# Patient Record
Sex: FEMALE | Race: WHITE | NOT HISPANIC OR LATINO | Employment: FULL TIME | URBAN - METROPOLITAN AREA
[De-identification: names, ages, dates, MRNs, and addresses within clinical notes are randomized per-mention and may not be internally consistent; named-entity substitution may affect disease eponyms.]

---

## 2017-08-07 ENCOUNTER — HOME HEALTH ADMISSION (OUTPATIENT)
Dept: HOME HEALTH SERVICES | Facility: HOME HEALTHCARE | Age: 57
End: 2017-08-07

## 2017-08-18 DIAGNOSIS — Z01.810 PRE-OPERATIVE CARDIOVASCULAR EXAMINATION: ICD-10-CM

## 2017-08-18 DIAGNOSIS — Z01.812 PRE-OPERATIVE LABORATORY EXAMINATION: ICD-10-CM

## 2017-08-18 LAB
ANION GAP SERPL CALC-SCNC: 6 MMOL/L (ref 0–11.9)
BUN SERPL-MCNC: 17 MG/DL (ref 8–22)
CALCIUM SERPL-MCNC: 9.7 MG/DL (ref 8.5–10.5)
CHLORIDE SERPL-SCNC: 106 MMOL/L (ref 96–112)
CO2 SERPL-SCNC: 27 MMOL/L (ref 20–33)
CREAT SERPL-MCNC: 0.87 MG/DL (ref 0.5–1.4)
EKG IMPRESSION: NORMAL
ERYTHROCYTE [DISTWIDTH] IN BLOOD BY AUTOMATED COUNT: 42.9 FL (ref 35.9–50)
GFR SERPL CREATININE-BSD FRML MDRD: >60 ML/MIN/1.73 M 2
GLUCOSE SERPL-MCNC: 103 MG/DL (ref 65–99)
HCT VFR BLD AUTO: 39 % (ref 37–47)
HGB BLD-MCNC: 12.9 G/DL (ref 12–16)
MCH RBC QN AUTO: 29.9 PG (ref 27–33)
MCHC RBC AUTO-ENTMCNC: 33.1 G/DL (ref 33.6–35)
MCV RBC AUTO: 90.5 FL (ref 81.4–97.8)
PLATELET # BLD AUTO: 345 K/UL (ref 164–446)
PMV BLD AUTO: 9.2 FL (ref 9–12.9)
POTASSIUM SERPL-SCNC: 3.7 MMOL/L (ref 3.6–5.5)
RBC # BLD AUTO: 4.31 M/UL (ref 4.2–5.4)
SODIUM SERPL-SCNC: 139 MMOL/L (ref 135–145)
WBC # BLD AUTO: 6.5 K/UL (ref 4.8–10.8)

## 2017-08-18 PROCEDURE — 80048 BASIC METABOLIC PNL TOTAL CA: CPT

## 2017-08-18 PROCEDURE — 93005 ELECTROCARDIOGRAM TRACING: CPT

## 2017-08-18 PROCEDURE — 87640 STAPH A DNA AMP PROBE: CPT

## 2017-08-18 PROCEDURE — 85027 COMPLETE CBC AUTOMATED: CPT

## 2017-08-18 PROCEDURE — 36415 COLL VENOUS BLD VENIPUNCTURE: CPT

## 2017-08-18 PROCEDURE — 87641 MR-STAPH DNA AMP PROBE: CPT

## 2017-08-18 PROCEDURE — 93010 ELECTROCARDIOGRAM REPORT: CPT | Performed by: INTERNAL MEDICINE

## 2017-08-18 RX ORDER — IBUPROFEN 200 MG
200-400 TABLET ORAL EVERY 6 HOURS PRN
Status: ON HOLD | COMMUNITY
End: 2021-12-23

## 2017-08-18 RX ORDER — CALCIUM/MAGNESIUM/ZINC 333-133 MG
TABLET ORAL
COMMUNITY
End: 2021-12-22

## 2017-08-18 NOTE — DISCHARGE PLANNING
DISCHARGE PLANNING NOTE - TOTAL JOINT     Procedure: Procedure(s):  KNEE ARTHROPLASTY TOTAL  Procedure Date: 9/5/2017  Insurance:  Payor: [employer] / Plan: [employer] MISC PLAN / Product Type: *No Product type* /   Equipment currently available at home? None  Steps into the home? 2  Steps within the home? 0  Toilet height? Standard  Type of shower? walk-in shower  Who will be with you during your recovery? son  Is Outpatient Physical Therapy set up after surgery? No   Did you take the Total Joint Class and where? No     Plan: The patient stated that she will be scheduling a class with [facility], and will also research and setup Physical Therapy.

## 2017-08-19 LAB
SCCMEC + MECA PNL NOSE NAA+PROBE: NEGATIVE
SCCMEC + MECA PNL NOSE NAA+PROBE: NEGATIVE

## 2017-09-05 ENCOUNTER — APPOINTMENT (OUTPATIENT)
Dept: RADIOLOGY | Facility: MEDICAL CENTER | Age: 57
DRG: 470 | End: 2017-09-05
Attending: ORTHOPAEDIC SURGERY
Payer: COMMERCIAL

## 2017-09-05 ENCOUNTER — HOSPITAL ENCOUNTER (INPATIENT)
Facility: MEDICAL CENTER | Age: 57
LOS: 1 days | DRG: 470 | End: 2017-09-06
Attending: ORTHOPAEDIC SURGERY | Admitting: ORTHOPAEDIC SURGERY
Payer: COMMERCIAL

## 2017-09-05 PROBLEM — M17.11 OSTEOARTHRITIS OF RIGHT KNEE: Status: ACTIVE | Noted: 2017-09-05

## 2017-09-05 PROCEDURE — 97161 PT EVAL LOW COMPLEX 20 MIN: CPT

## 2017-09-05 PROCEDURE — L1830 KO IMMOB CANVAS LONG PRE OTS: HCPCS | Performed by: ORTHOPAEDIC SURGERY

## 2017-09-05 PROCEDURE — A9270 NON-COVERED ITEM OR SERVICE: HCPCS | Performed by: ORTHOPAEDIC SURGERY

## 2017-09-05 PROCEDURE — 500096 HCHG BONE CEMENT, SIMPLEX ANTIBIOTIC: Performed by: ORTHOPAEDIC SURGERY

## 2017-09-05 PROCEDURE — G8978 MOBILITY CURRENT STATUS: HCPCS | Mod: CK

## 2017-09-05 PROCEDURE — 502000 HCHG MISC OR IMPLANTS RC 0278: Performed by: ORTHOPAEDIC SURGERY

## 2017-09-05 PROCEDURE — G8979 MOBILITY GOAL STATUS: HCPCS | Mod: CJ

## 2017-09-05 PROCEDURE — 501838 HCHG SUTURE GENERAL: Performed by: ORTHOPAEDIC SURGERY

## 2017-09-05 PROCEDURE — 700111 HCHG RX REV CODE 636 W/ 250 OVERRIDE (IP)

## 2017-09-05 PROCEDURE — 700101 HCHG RX REV CODE 250

## 2017-09-05 PROCEDURE — 94760 N-INVAS EAR/PLS OXIMETRY 1: CPT

## 2017-09-05 PROCEDURE — A6222 GAUZE <=16 IN NO W/SAL W/O B: HCPCS | Performed by: ORTHOPAEDIC SURGERY

## 2017-09-05 PROCEDURE — 160035 HCHG PACU - 1ST 60 MINS PHASE I: Performed by: ORTHOPAEDIC SURGERY

## 2017-09-05 PROCEDURE — 160048 HCHG OR STATISTICAL LEVEL 1-5: Performed by: ORTHOPAEDIC SURGERY

## 2017-09-05 PROCEDURE — 73560 X-RAY EXAM OF KNEE 1 OR 2: CPT | Mod: RT

## 2017-09-05 PROCEDURE — 500093 HCHG BONE CEMENT MIXER: Performed by: ORTHOPAEDIC SURGERY

## 2017-09-05 PROCEDURE — 160029 HCHG SURGERY MINUTES - 1ST 30 MINS LEVEL 4: Performed by: ORTHOPAEDIC SURGERY

## 2017-09-05 PROCEDURE — 501480 HCHG STOCKINETTE: Performed by: ORTHOPAEDIC SURGERY

## 2017-09-05 PROCEDURE — 700102 HCHG RX REV CODE 250 W/ 637 OVERRIDE(OP): Performed by: ORTHOPAEDIC SURGERY

## 2017-09-05 PROCEDURE — 500088 HCHG BLADE, SAGITTAL: Performed by: ORTHOPAEDIC SURGERY

## 2017-09-05 PROCEDURE — 700112 HCHG RX REV CODE 229: Performed by: ORTHOPAEDIC SURGERY

## 2017-09-05 PROCEDURE — 500423 HCHG DRESSING, ABD COMBINE: Performed by: ORTHOPAEDIC SURGERY

## 2017-09-05 PROCEDURE — 160002 HCHG RECOVERY MINUTES (STAT): Performed by: ORTHOPAEDIC SURGERY

## 2017-09-05 PROCEDURE — 501486 HCHG STRYKER IRRIG SET HC W/TUBING: Performed by: ORTHOPAEDIC SURGERY

## 2017-09-05 PROCEDURE — 500811 HCHG LENS/HOOD FOR SPACESUIT: Performed by: ORTHOPAEDIC SURGERY

## 2017-09-05 PROCEDURE — 160009 HCHG ANES TIME/MIN: Performed by: ORTHOPAEDIC SURGERY

## 2017-09-05 PROCEDURE — 700111 HCHG RX REV CODE 636 W/ 250 OVERRIDE (IP): Performed by: ORTHOPAEDIC SURGERY

## 2017-09-05 PROCEDURE — 160041 HCHG SURGERY MINUTES - EA ADDL 1 MIN LEVEL 4: Performed by: ORTHOPAEDIC SURGERY

## 2017-09-05 PROCEDURE — 160022 HCHG BLOCK: Performed by: ORTHOPAEDIC SURGERY

## 2017-09-05 PROCEDURE — 160036 HCHG PACU - EA ADDL 30 MINS PHASE I: Performed by: ORTHOPAEDIC SURGERY

## 2017-09-05 PROCEDURE — 700105 HCHG RX REV CODE 258

## 2017-09-05 PROCEDURE — 501487 HCHG STRYKER TIP: Performed by: ORTHOPAEDIC SURGERY

## 2017-09-05 PROCEDURE — 770006 HCHG ROOM/CARE - MED/SURG/GYN SEMI*

## 2017-09-05 PROCEDURE — 0SRC0J9 REPLACEMENT OF RIGHT KNEE JOINT WITH SYNTHETIC SUBSTITUTE, CEMENTED, OPEN APPROACH: ICD-10-PCS | Performed by: ORTHOPAEDIC SURGERY

## 2017-09-05 PROCEDURE — 700105 HCHG RX REV CODE 258: Performed by: ORTHOPAEDIC SURGERY

## 2017-09-05 PROCEDURE — 502579 HCHG PACK, TOTAL KNEE: Performed by: ORTHOPAEDIC SURGERY

## 2017-09-05 DEVICE — IMPLANTABLE DEVICE: Type: IMPLANTABLE DEVICE | Status: FUNCTIONAL

## 2017-09-05 DEVICE — CEMENT BONE SIMPLEX W/GENTAICIN (10/PK): Type: IMPLANTABLE DEVICE | Status: FUNCTIONAL

## 2017-09-05 RX ORDER — MORPHINE SULFATE 0.5 MG/ML
INJECTION, SOLUTION EPIDURAL; INTRATHECAL; INTRAVENOUS
Status: DISCONTINUED | OUTPATIENT
Start: 2017-09-05 | End: 2017-09-05 | Stop reason: HOSPADM

## 2017-09-05 RX ORDER — ONDANSETRON 2 MG/ML
4 INJECTION INTRAMUSCULAR; INTRAVENOUS EVERY 4 HOURS PRN
Status: DISCONTINUED | OUTPATIENT
Start: 2017-09-05 | End: 2017-09-06 | Stop reason: HOSPADM

## 2017-09-05 RX ORDER — SCOLOPAMINE TRANSDERMAL SYSTEM 1 MG/1
1 PATCH, EXTENDED RELEASE TRANSDERMAL
Status: DISCONTINUED | OUTPATIENT
Start: 2017-09-05 | End: 2017-09-06 | Stop reason: HOSPADM

## 2017-09-05 RX ORDER — BUPIVACAINE HYDROCHLORIDE 2.5 MG/ML
INJECTION, SOLUTION EPIDURAL; INFILTRATION; INTRACAUDAL
Status: COMPLETED
Start: 2017-09-05 | End: 2017-09-05

## 2017-09-05 RX ORDER — MIDAZOLAM HYDROCHLORIDE 1 MG/ML
INJECTION INTRAMUSCULAR; INTRAVENOUS
Status: COMPLETED
Start: 2017-09-05 | End: 2017-09-05

## 2017-09-05 RX ORDER — DIPHENHYDRAMINE HYDROCHLORIDE 50 MG/ML
25 INJECTION INTRAMUSCULAR; INTRAVENOUS EVERY 6 HOURS PRN
Status: DISCONTINUED | OUTPATIENT
Start: 2017-09-05 | End: 2017-09-06 | Stop reason: HOSPADM

## 2017-09-05 RX ORDER — CELECOXIB 200 MG/1
200 CAPSULE ORAL 2 TIMES DAILY WITH MEALS
Status: DISCONTINUED | OUTPATIENT
Start: 2017-09-05 | End: 2017-09-06 | Stop reason: HOSPADM

## 2017-09-05 RX ORDER — OXYCODONE HYDROCHLORIDE 5 MG/1
2.5 TABLET ORAL
Status: DISCONTINUED | OUTPATIENT
Start: 2017-09-05 | End: 2017-09-06 | Stop reason: HOSPADM

## 2017-09-05 RX ORDER — BUPIVACAINE HYDROCHLORIDE AND EPINEPHRINE 5; 5 MG/ML; UG/ML
INJECTION, SOLUTION EPIDURAL; INTRACAUDAL; PERINEURAL
Status: DISCONTINUED | OUTPATIENT
Start: 2017-09-05 | End: 2017-09-05 | Stop reason: HOSPADM

## 2017-09-05 RX ORDER — ACETAMINOPHEN 500 MG
1000 TABLET ORAL EVERY 6 HOURS PRN
COMMUNITY
End: 2021-12-22

## 2017-09-05 RX ORDER — BISACODYL 10 MG
10 SUPPOSITORY, RECTAL RECTAL
Status: DISCONTINUED | OUTPATIENT
Start: 2017-09-05 | End: 2017-09-06 | Stop reason: HOSPADM

## 2017-09-05 RX ORDER — DOCUSATE SODIUM 100 MG/1
100 CAPSULE, LIQUID FILLED ORAL 2 TIMES DAILY
Status: DISCONTINUED | OUTPATIENT
Start: 2017-09-05 | End: 2017-09-06

## 2017-09-05 RX ORDER — SODIUM CHLORIDE, SODIUM LACTATE, POTASSIUM CHLORIDE, CALCIUM CHLORIDE 600; 310; 30; 20 MG/100ML; MG/100ML; MG/100ML; MG/100ML
INJECTION, SOLUTION INTRAVENOUS
Status: DISCONTINUED | OUTPATIENT
Start: 2017-09-05 | End: 2017-09-06

## 2017-09-05 RX ORDER — EPINEPHRINE 1 MG/ML
INJECTION, SOLUTION, CONCENTRATE INTRAVENOUS
Status: COMPLETED
Start: 2017-09-05 | End: 2017-09-05

## 2017-09-05 RX ORDER — HALOPERIDOL 5 MG/ML
1 INJECTION INTRAMUSCULAR EVERY 6 HOURS PRN
Status: DISCONTINUED | OUTPATIENT
Start: 2017-09-05 | End: 2017-09-06 | Stop reason: HOSPADM

## 2017-09-05 RX ORDER — POLYETHYLENE GLYCOL 3350 17 G/17G
1 POWDER, FOR SOLUTION ORAL 2 TIMES DAILY PRN
Status: DISCONTINUED | OUTPATIENT
Start: 2017-09-05 | End: 2017-09-06 | Stop reason: HOSPADM

## 2017-09-05 RX ORDER — DEXAMETHASONE SODIUM PHOSPHATE 4 MG/ML
4 INJECTION, SOLUTION INTRA-ARTICULAR; INTRALESIONAL; INTRAMUSCULAR; INTRAVENOUS; SOFT TISSUE
Status: DISCONTINUED | OUTPATIENT
Start: 2017-09-05 | End: 2017-09-06 | Stop reason: HOSPADM

## 2017-09-05 RX ORDER — KETOROLAC TROMETHAMINE 30 MG/ML
INJECTION, SOLUTION INTRAMUSCULAR; INTRAVENOUS
Status: DISCONTINUED | OUTPATIENT
Start: 2017-09-05 | End: 2017-09-05 | Stop reason: HOSPADM

## 2017-09-05 RX ORDER — AMOXICILLIN 250 MG
1 CAPSULE ORAL
Status: DISCONTINUED | OUTPATIENT
Start: 2017-09-05 | End: 2017-09-06 | Stop reason: HOSPADM

## 2017-09-05 RX ORDER — LIDOCAINE HYDROCHLORIDE 10 MG/ML
INJECTION, SOLUTION INFILTRATION; PERINEURAL
Status: COMPLETED
Start: 2017-09-05 | End: 2017-09-05

## 2017-09-05 RX ORDER — OXYCODONE HYDROCHLORIDE 5 MG/1
5 TABLET ORAL
Status: DISCONTINUED | OUTPATIENT
Start: 2017-09-05 | End: 2017-09-06 | Stop reason: HOSPADM

## 2017-09-05 RX ORDER — TRANEXAMIC ACID 100 MG/ML
INJECTION, SOLUTION INTRAVENOUS
Status: COMPLETED
Start: 2017-09-05 | End: 2017-09-05

## 2017-09-05 RX ORDER — ENEMA 19; 7 G/133ML; G/133ML
1 ENEMA RECTAL
Status: DISCONTINUED | OUTPATIENT
Start: 2017-09-05 | End: 2017-09-06 | Stop reason: HOSPADM

## 2017-09-05 RX ORDER — DEXAMETHASONE SODIUM PHOSPHATE 4 MG/ML
INJECTION, SOLUTION INTRA-ARTICULAR; INTRALESIONAL; INTRAMUSCULAR; INTRAVENOUS; SOFT TISSUE
Status: COMPLETED
Start: 2017-09-05 | End: 2017-09-05

## 2017-09-05 RX ORDER — AMOXICILLIN 250 MG
1 CAPSULE ORAL NIGHTLY
Status: DISCONTINUED | OUTPATIENT
Start: 2017-09-05 | End: 2017-09-06 | Stop reason: HOSPADM

## 2017-09-05 RX ORDER — DOCUSATE SODIUM 100 MG/1
100 CAPSULE, LIQUID FILLED ORAL 2 TIMES DAILY
COMMUNITY
End: 2021-12-22

## 2017-09-05 RX ORDER — DOCUSATE SODIUM 100 MG/1
100 CAPSULE, LIQUID FILLED ORAL 2 TIMES DAILY
Status: DISCONTINUED | OUTPATIENT
Start: 2017-09-05 | End: 2017-09-06 | Stop reason: HOSPADM

## 2017-09-05 RX ORDER — DEXTROSE MONOHYDRATE, SODIUM CHLORIDE, AND POTASSIUM CHLORIDE 50; 1.49; 4.5 G/1000ML; G/1000ML; G/1000ML
INJECTION, SOLUTION INTRAVENOUS CONTINUOUS
Status: DISCONTINUED | OUTPATIENT
Start: 2017-09-05 | End: 2017-09-06 | Stop reason: HOSPADM

## 2017-09-05 RX ORDER — TRAMADOL HYDROCHLORIDE 50 MG/1
50 TABLET ORAL EVERY 4 HOURS PRN
Status: DISCONTINUED | OUTPATIENT
Start: 2017-09-05 | End: 2017-09-06 | Stop reason: HOSPADM

## 2017-09-05 RX ADMIN — DOCUSATE SODIUM 100 MG: 100 CAPSULE, LIQUID FILLED ORAL at 13:25

## 2017-09-05 RX ADMIN — CELECOXIB 200 MG: 200 CAPSULE ORAL at 18:22

## 2017-09-05 RX ADMIN — LIDOCAINE HYDROCHLORIDE 0.2 ML: 10 INJECTION, SOLUTION INFILTRATION; PERINEURAL at 06:55

## 2017-09-05 RX ADMIN — OXYCODONE HYDROCHLORIDE 5 MG: 5 TABLET ORAL at 20:17

## 2017-09-05 RX ADMIN — SODIUM CHLORIDE, SODIUM LACTATE, POTASSIUM CHLORIDE, CALCIUM CHLORIDE: 600; 310; 30; 20 INJECTION, SOLUTION INTRAVENOUS at 06:55

## 2017-09-05 RX ADMIN — DOCUSATE SODIUM 100 MG: 100 CAPSULE, LIQUID FILLED ORAL at 20:17

## 2017-09-05 RX ADMIN — DOCUSATE SODIUM AND SENNOSIDES 1 TABLET: 8.6; 5 TABLET, FILM COATED ORAL at 20:18

## 2017-09-05 RX ADMIN — CELECOXIB 200 MG: 200 CAPSULE ORAL at 13:24

## 2017-09-05 RX ADMIN — DEXTROSE MONOHYDRATE, SODIUM CHLORIDE, AND POTASSIUM CHLORIDE: 50; 1.49; 4.5 INJECTION, SOLUTION INTRAVENOUS at 11:48

## 2017-09-05 RX ADMIN — OXYCODONE HYDROCHLORIDE 5 MG: 5 TABLET ORAL at 12:42

## 2017-09-05 RX ADMIN — SODIUM CHLORIDE 2 G: 9 INJECTION, SOLUTION INTRAVENOUS at 16:53

## 2017-09-05 RX ADMIN — VANCOMYCIN HYDROCHLORIDE 1 G: 1 INJECTION, POWDER, LYOPHILIZED, FOR SOLUTION INTRAVENOUS at 06:56

## 2017-09-05 ASSESSMENT — PAIN SCALES - GENERAL
PAINLEVEL_OUTOF10: 7
PAINLEVEL_OUTOF10: 0
PAINLEVEL_OUTOF10: 7
PAINLEVEL_OUTOF10: 0
PAINLEVEL_OUTOF10: 3
PAINLEVEL_OUTOF10: 4
PAINLEVEL_OUTOF10: ASSUMED PAIN PRESENT
PAINLEVEL_OUTOF10: 7
PAINLEVEL_OUTOF10: 0
PAINLEVEL_OUTOF10: 8

## 2017-09-05 ASSESSMENT — GAIT ASSESSMENTS
GAIT LEVEL OF ASSIST: STAND BY ASSIST
DISTANCE (FEET): 100
ASSISTIVE DEVICE: FRONT WHEEL WALKER
DEVIATION: STEP TO

## 2017-09-05 ASSESSMENT — LIFESTYLE VARIABLES
EVER_SMOKED: NEVER
ALCOHOL_USE: YES
HAVE YOU EVER FELT YOU SHOULD CUT DOWN ON YOUR DRINKING: NO
EVER_SMOKED: NEVER
ON A TYPICAL DAY WHEN YOU DRINK ALCOHOL HOW MANY DRINKS DO YOU HAVE: 1
TOTAL SCORE: 0
HOW MANY TIMES IN THE PAST YEAR HAVE YOU HAD 5 OR MORE DRINKS IN A DAY: 0
HAVE PEOPLE ANNOYED YOU BY CRITICIZING YOUR DRINKING: NO
TOTAL SCORE: 0
TOTAL SCORE: 0
EVER FELT BAD OR GUILTY ABOUT YOUR DRINKING: NO
EVER HAD A DRINK FIRST THING IN THE MORNING TO STEADY YOUR NERVES TO GET RID OF A HANGOVER: NO
AVERAGE NUMBER OF DAYS PER WEEK YOU HAVE A DRINK CONTAINING ALCOHOL: 3
CONSUMPTION TOTAL: NEGATIVE

## 2017-09-05 ASSESSMENT — PATIENT HEALTH QUESTIONNAIRE - PHQ9
SUM OF ALL RESPONSES TO PHQ9 QUESTIONS 1 AND 2: 0
1. LITTLE INTEREST OR PLEASURE IN DOING THINGS: NOT AT ALL
2. FEELING DOWN, DEPRESSED, IRRITABLE, OR HOPELESS: NOT AT ALL
SUM OF ALL RESPONSES TO PHQ QUESTIONS 1-9: 0

## 2017-09-05 NOTE — FLOWSHEET NOTE
09/05/17 1333   Interdisciplinary Plan of Care-Goals (Indications)   Hyperinflation Protocol Indications Pre or Post-op Abdominal, Thoracic or Orthopedic Surgery   Interdisciplinary Plan of Care-Outcomes    Hyperinflation Protocol Goals/Outcome Greater Than 60% of Predicted I.S. Volume x 24 hrs   Education   Education Yes - Pt. / Family has been Instructed in use of Respiratory Equipment   Incentive Spirometry Group   Incentive Spirometry Instruction Yes   Breathing Exercises Yes   Incentive Spirometer Volume 2500 mL   Incentive Spirometer Date Last Changed 09/05/17   Incentive Spirometer Next Change Date (Q 30 Days) 10/05/17   Chest Exam   Respiration 16   Heart Rate (Monitored) 60   Oximetry   #Pulse Oximetry (Single Determination) Yes   Oxygen   Home O2 Use Prior To Admission? No   Pulse Oximetry 99 %   O2 (LPM) 0   O2 Daily Delivery Respiratory  Room Air with O2 Available

## 2017-09-05 NOTE — OP REPORT
DATE OF SERVICE:  09/05/2017    PREOPERATIVE DIAGNOSES:  Right knee severe medial greater than lateral   compartment osteoarthritis, status post lengthy nonoperative care and also   status post 3 previous knee surgeries with ACL revision, ACL as well as medial   collateral ligament repair.    POSTOPERATIVE DIAGNOSES:  Right knee severe medial greater than lateral   compartment osteoarthritis, status post lengthy nonoperative care and also   status post 3 previous knee surgeries with ACL revision, ACL as well as medial   collateral ligament repair.    SURGICAL PROCEDURE:  Right total knee arthroplasty.    SURGEON:  Cayden Schmitz MD    ASSISTANT SURGEON:  Mu Cardoso MD    INDICATIONS:  A 56-year-old female, fairly high level recreational athlete,   who has had severe right knee osteoarthritis for a number of years.  The   patient has failed multiple attempts of ACL reconstruction.  She has   difficulty with activities of daily living and exceptional problems with   recreational activities due to pain.  The patient failed nonoperative care and   opted for surgical intervention.    Examination under anesthesia, no flexion contracture.  She had about 2+ varus   orientation to the knee with 2+ opening and good endpoint on MCL testing,   incompetent ACL on testing, though intact PCL, and about 1-2+ lateral opening   as well with varus stress.    DESCRIPTION OF PROCEDURE:  Patient was brought to the operating room, placed   supine on the OR table.  General anesthesia induced smoothly.  Preoperative   adductor block was performed by Dr. Lou by my request using ultrasonic   guidance.  Right knee was sterilely prepped and draped.  After exsanguination   with Esmarch, tourniquet was inflated to 250 mmHg.    Next, through a straight anterior approach, a median parapatellar approach to   the knee was performed.  The patellofemoral ligaments were released.  The   patella was everted and the knee was flexed.  Clean up of  some residual ACL   stump as well as a fairly large tissue within the mid portion of the tibia   filled with soft tissue was performed as well as removing any residual   meniscal tissue, taking care not to destabilize the medial collateral ligament   structures.  Next, a starting hole for an intramedullary lilliana was placed.    Intramedullary lilliana was placed in standard fashion.  After correct rotation   going off [place]'s line, the 10 mm cutting block was pinned into position.    The distal cuts were made and during all cutting, the extensor mechanism and   medial collateral ligaments were always protected.  Next, due to some   tightness in the knee, the decision was made to proceed with the tibial cut.    Next, the external tibial cutting guide was placed keying off the medial side,   which was down to bone on the tibia, taking 2 mm off of this.  Once in   correct position in varus/valgus as well as in the frontal plane keying off   the anterior subcutaneous border of the tibia, tibia was pinned into position.    The tibial cut was made protecting the extensor mechanism and MCL with a   nice flat cut and the tibia was sized to a size C.  Next, the feet for the   femoral sizing jig were easily placed on the posterior condyle.  This was   slightly rotated to be in line with [place] line and transepicondylar axis   and then the 2-3 mm external rotation holes were drilled.  A 4-in-1 cutting   block was then placed after the femur was sized to a size 7.  Once this had   been performed, it was just determined with the trial that a 7 was slightly   too large and this was downsized to a 6 making the residual saw cuts and with   no notching.  Next, the femoral trial was placed.  The tibia was sized to a C.    There was noted to be still excessive tension, both in extension and   flexion.  A 2 more mm cut was then made.  Following this, there was noted to   be a good 10 mm gap in flexion and extension, though about 8 mm  gap on the   medial side with acceptable tension on the lateral side.  Thus, the decision   to gently pie-crust the MCL using a lamina .  With gentle pie-crusting   eventually, a cruciate-retaining 10 mm bearing was able to be placed and   there was good stability at 20, 60, and 90 degrees of flexion.  A medial   congruent trial as well was placed and this gave improved stability and   tracking though at about 60-70 degrees of flexion, there was slight excessive   femoral rollback and kicking the trial anterior.  The portion of the PCL was   recessed off the intercondylar notch slowly until this lift off was   alleviated.  Following this, with a 10 mm bearing, there was good stability at   0 degrees, 20 degrees, 60 and 90 degrees of flexion and with maximal flexion,   there was no more significant liftoff of the tibial trial.  Next, any   peripheral osteophytes about the patella were removed, though the patellar   cartilage was noted to be excellent and the decision was made not to resurface   of the patella.  Next, the tibial base plate was placed in the center of the   tibia rotated so that the center was just about the medial third to the mid   portion of the tibial tubercle, pinned into position and then the cruciform   impactor was used for final implantation.  The hard bone about the medial   tibia was drilled with a small drill for better cement interdigitation.  Soft   tissue was removed from the previous arthroscopic tunnel and there was noted   to be a through-and-through hole out the anterior tibia.  The suture and soft   tissues were removed.  A plug of bone was then fashioned, flushed with the   anterior tibia to block this tunnel, though the peripheral rim was noted to be   completely intact.  Next, the entire knee was thoroughly irrigated with   dilute Betadine solution, any osteophytes were removed from the posterior   aspect of the knee, both palpably and visibly.  Again, all outer gloves  were   changed.  A double batch mix of Simplex high viscosity cement with gentamicin   was then mixed and then the tibia and femur were implanted in the standard   fashion removing all excessive cement.  A 10 mm bearing was then placed.  The   knee was kept at about 30 degrees short of full extension with posterior marcus   pressure for good pressurization and extrusion of excessive cement.  This was   removed and this position held until complete polymerization.  Next, with the   medial congruent trial in place, this was trialed as well as a standard CR   bearing as well as an ultra-constrained bearing, even though there was at   least half of the PCL remaining.  The medial congruent bearing gave the best   AP stability, the best stability of the joint, both at 0, 20, 60, and 90   degrees of flexion as well as hyperflexion with no movement of the trial   bearing.  The decision was made for an 11 mm medial congruent bearing.  All   trials were removed.  The knee was thoroughly irrigated with dilute Betadine   solution.  The posterior capsule and base of PCL were all infiltrated with a   combination of 0.5% Marcaine with epinephrine 30 mL, 30 mg of Toradol, and 5   mg of morphine.  Next, the final bearing was implanted in the standard   position.  Of note, there was no finger pressure needed for good   patellofemoral tracking.  Tourniquet was deflated.  One more gram of   tranexamic acid was given.  The extensor mechanism was closed with a #1   Stratafix PDS suture, double crossing each level of the closure and cutting   the residual suture.  The knee was flexed up to 130 degrees.  There was noted   to be no failure or gapping of the extensor mechanism closure.  A deep medium   Hemovac drain was left.  Next, the subcutaneous tissue was closed with 3-0   Vicryl, skin was closed with a running everting horizontal mattress nylon   suture.  Next, a sterile dressing was applied followed by a knee immobilizer.    Patient was  awakened in the operating room and taken to recovery room in   stable condition.    TOURNIQUET TIME:  94 minutes.    COMPLICATIONS:  None.    DISPOSITION:  To the recovery room inserted and then to the floor when stable.       ____________________________________     MD PEPE HENDERSON / GERSON    DD:  09/05/2017 10:56:13  DT:  09/05/2017 11:53:44    D#:  [id number]  Job#:  [id number]

## 2017-09-05 NOTE — PROGRESS NOTES
Pt seen by break RN upon admit. Assumed care. This pt is AOx4, ambulatory with SBA and FWW, no N/V, voiding, recently medicated for pain. Patient and RN discussed plan of care including IV abx and pain mgmt: questions answered. Labs noted, assessment complete, patient tolerating regular diet. Call light in place, fall precautions in place, patient educated on importance of calling for assistance. No additional needs at this time. VSS

## 2017-09-05 NOTE — DISCHARGE PLANNING
Care Transition Team Assessment    SW intern met with patient at bedside. Patient will return home alone with help from numerous family members (large support system). Patient has no questions or concerns at this time.     Information Source  Orientation : Oriented x 4  Information Given By: Patient  Informant's Name: Coleen  Who is responsible for making decisions for patient? : Patient    Readmission Evaluation  Is this a readmission?: No    Elopement Risk  Legal Hold: No  Ambulatory or Self Mobile in Wheelchair: Yes  Disoriented: No  Psychiatric Symptoms: None  History of Wandering: No  Elopement this Admit: No  Vocalizing Wanting to Leave: No  Displays Behaviors, Body Language Wanting to Leave: No-Not at Risk for Elopement  Elopement Risk: Not at Risk for Elopement    Interdisciplinary Discharge Planning  Lives with - Patient's Self Care Capacity: Spouse  Housing / Facility: [address] [place] [address]  Prior Services: Home-Independent    Discharge Preparedness  What is your plan after discharge?: Home with help  What are your discharge supports?: Child  Prior Functional Level: Independent with Activities of Daily Living  Difficulity with ADLs: None  Difficulity with IADLs: None    Functional Assesment  Prior Functional Level: Independent with Activities of Daily Living    Finances  Financial Barriers to Discharge: No  Prescription Coverage: Yes    Vision / Hearing Impairment  Right Eye Vision: Impaired, Wears Glasses  Left Eye Vision: Impaired, Wears Glasses          Psychological Assessment  History of Substance Abuse: None  History of Psychiatric Problems: No  Non-compliant with Treatment: No  Newly Diagnosed Illness: No    Discharge Risks or Barriers  Discharge risks or barriers?: No    Anticipated Discharge Information  Anticipated discharge disposition: Home  Discharge Address: [address] [place] [id number]  Discharge Contact Phone Number: [phone]      This note has been reviewed by Vivian BALDERAS

## 2017-09-05 NOTE — PROGRESS NOTES
Arrived to unit. Reporting pain level increasing.  Reviewed allergies with patient and she states most pain meds make her vomit however with crackers she will try the oxycodone for pain relief.

## 2017-09-05 NOTE — OR SURGEON
Operative Report    PreOp Diagnosis: deleted op report    PostOp Diagnosis: deleted op report    Procedure(s):  KNEE ARTHROPLASTY TOTAL - Wound Class: Clean    Surgeon(s):  BING Bettencourt M.D.    Anesthesiologist/Type of Anesthesia:  Anesthesiologist: Nick Lou M.D./General    Surgical Staff:  Circulator: Fior Montague R.N.  Limb Lee: Darius Arteaga  Relief Circulator: Sahil Menezes R.N.  Scrub Person: Maxx Walden    Specimens:  deleted op report    Estimated Blood Loss:   deleted op report    Findings:   deleted op report    Complications:   deleted op report        9/5/2017 10:37 AM Cayden Schmitz

## 2017-09-05 NOTE — CARE PLAN
Problem: Pain Management  Goal: Pain level will decrease to patient's comfort goal  Outcome: PROGRESSING AS EXPECTED  Numeric scale to assess pain. Prns per MAR. Rest/reposition for comfort.     Problem: Safety  Goal: Will remain free from injury  Outcome: PROGRESSING AS EXPECTED  Mobility assessed. Pt out of bed with SBA and FWW. Educated on the importance of calling for assistance, verbalizes understanding. Upper bed rails up x2, bed in low/locked position, hourly rounding in place, treaded socks on.

## 2017-09-05 NOTE — OR NURSING
1111 Received report from Shanice RUSHING, assumed care of pt. Pt arousable on calling, denies nausea and pain with non-labored and spontaneous resirations via 1L NC, VSS. Right knee surgical incision, CDI.  1115 No changes, VSS.  1126 Report to Shanice RN

## 2017-09-05 NOTE — DISCHARGE PLANNING
SHERRIE received a phone call from Gretchen with [facility] ( [phone])  She stated that the referral for  was accepted.

## 2017-09-05 NOTE — THERAPY
"Physical Therapy Evaluation completed.   Bed Mobility:  Supine to Sit: Supervised  Transfers: Sit to Stand: Stand by Assist  Gait: Level Of Assist: Stand by Assist with Front-Wheel Walker       Plan of Care: Will benefit from Physical Therapy 7 times per week  Discharge Recommendations: Equipment: No Equipment Needed. Post-acute therapy Discharge to home with outpatient or home health for additional skilled therapy services.    See \"Rehab Therapy-Acute\" Patient Summary Report for complete documentation.     "

## 2017-09-05 NOTE — OR NURSING
0611 PT TO PRE OP TO ASSUME CARE.   0701 Patient allergies and NPO status verified, home medication reconciliation completed and belongings secured. Patient verbalizes understanding of pain scale, expected course of stay and plan of care. Surgical site verified with patient. IV access established. Sequentials placed on L leg

## 2017-09-05 NOTE — OR NURSING
1034: Patient to PACU from OR.  No airway in please, on 4 lpm saturating at 98%.  Respirations even and unlabored, breath sounds clear bilaterally. Immobilizer to right knee, hemovac compressed, no drainage. Pedal pulse present in right foot, cap refill <3 seconds, toes pink and warm.    1045: Patient responding to voice, reports no pain at this time.  1100: Patient reports her pain level is a 7/10 and states it is tolerable.  She falls back asleep after reporting this.  She does wake to voice.  I explained to her that she is still very sleepy from the anesthesia and so once she wakes a little more we can treat her pain if it increases and no longer feels like it is at a tolerable level. She nodded her head to this plan. Called son and gave an update.  1106: While patient is resting her HR drops to 49/48, when stimulated her HR goes back to mid fifties.  Monitoring respirations and O2 staurations closely. Saturating at 100% on 1 lpm, respirations are even and unlabored.  1111: Gave report to СЕРГЕЙ Son  1126: Report from Adelaida, awaiting x-ray, called report to СЕРГЕЙ Collier for transfer to floor.  1140: Called x-ray to follow up and make sure she gets her images before transfer to floor.  1155: Patient resting in bed, x-ray at bedside.  1205:Transport called for patient transport  1211: Patient meets all criteria for transfer.  Patient transported to floor.

## 2017-09-05 NOTE — OR SURGEON
Operative Report    PreOp Diagnosis: Right knee end-stage tricompartmental osteoarthritis.    PostOp Diagnosis: Same.    Procedure(s):  KNEE ARTHROPLASTY TOTAL - Wound Class: Clean    Surgeon(s):  BING Bettencourt M.D.    Anesthesiologist/Type of Anesthesia:  Anesthesiologist: Nick Lou M.D./General    Surgical Staff:  Circulator: Fior Montague R.N.  Limb Lee: Darius Arteaga  Relief Circulator: Sahil Menezes R.N.  Scrub Person: Maxx Walden    Specimens:  * No specimens in log *    Estimated Blood Loss: 150 mL.    Findings: Severe medial and lateral compartment osteoarthritis with well-preserved patellofemoral joint. Attenuated nonfunctioning ACL.    Complications: None.  Tourniquet: 94 minutes.    Disposition to the recovery room stable.        9/5/2017 10:31 AM Cayden Schmitz

## 2017-09-06 VITALS
HEART RATE: 55 BPM | TEMPERATURE: 97.6 F | OXYGEN SATURATION: 100 % | WEIGHT: 143.3 LBS | RESPIRATION RATE: 18 BRPM | SYSTOLIC BLOOD PRESSURE: 103 MMHG | DIASTOLIC BLOOD PRESSURE: 68 MMHG | HEIGHT: 64 IN | BODY MASS INDEX: 24.46 KG/M2

## 2017-09-06 LAB
ERYTHROCYTE [DISTWIDTH] IN BLOOD BY AUTOMATED COUNT: 42.5 FL (ref 35.9–50)
HCT VFR BLD AUTO: 29.5 % (ref 37–47)
HGB BLD-MCNC: 10.1 G/DL (ref 12–16)
MCH RBC QN AUTO: 30.3 PG (ref 27–33)
MCHC RBC AUTO-ENTMCNC: 34.2 G/DL (ref 33.6–35)
MCV RBC AUTO: 88.6 FL (ref 81.4–97.8)
PLATELET # BLD AUTO: 253 K/UL (ref 164–446)
PMV BLD AUTO: 9.2 FL (ref 9–12.9)
RBC # BLD AUTO: 3.33 M/UL (ref 4.2–5.4)
WBC # BLD AUTO: 10.7 K/UL (ref 4.8–10.8)

## 2017-09-06 PROCEDURE — 97530 THERAPEUTIC ACTIVITIES: CPT

## 2017-09-06 PROCEDURE — A9270 NON-COVERED ITEM OR SERVICE: HCPCS | Performed by: ORTHOPAEDIC SURGERY

## 2017-09-06 PROCEDURE — 36415 COLL VENOUS BLD VENIPUNCTURE: CPT

## 2017-09-06 PROCEDURE — G8987 SELF CARE CURRENT STATUS: HCPCS | Mod: CJ

## 2017-09-06 PROCEDURE — 700105 HCHG RX REV CODE 258: Performed by: ORTHOPAEDIC SURGERY

## 2017-09-06 PROCEDURE — 700112 HCHG RX REV CODE 229: Performed by: ORTHOPAEDIC SURGERY

## 2017-09-06 PROCEDURE — G8989 SELF CARE D/C STATUS: HCPCS | Mod: CJ

## 2017-09-06 PROCEDURE — 97535 SELF CARE MNGMENT TRAINING: CPT

## 2017-09-06 PROCEDURE — G8988 SELF CARE GOAL STATUS: HCPCS | Mod: CJ

## 2017-09-06 PROCEDURE — 97116 GAIT TRAINING THERAPY: CPT

## 2017-09-06 PROCEDURE — 85027 COMPLETE CBC AUTOMATED: CPT

## 2017-09-06 PROCEDURE — 700102 HCHG RX REV CODE 250 W/ 637 OVERRIDE(OP): Performed by: ORTHOPAEDIC SURGERY

## 2017-09-06 PROCEDURE — 700111 HCHG RX REV CODE 636 W/ 250 OVERRIDE (IP): Performed by: ORTHOPAEDIC SURGERY

## 2017-09-06 PROCEDURE — G8979 MOBILITY GOAL STATUS: HCPCS | Mod: CJ

## 2017-09-06 PROCEDURE — 97110 THERAPEUTIC EXERCISES: CPT

## 2017-09-06 PROCEDURE — G8980 MOBILITY D/C STATUS: HCPCS | Mod: CI

## 2017-09-06 PROCEDURE — 97165 OT EVAL LOW COMPLEX 30 MIN: CPT

## 2017-09-06 RX ADMIN — OXYCODONE HYDROCHLORIDE 5 MG: 5 TABLET ORAL at 08:55

## 2017-09-06 RX ADMIN — RIVAROXABAN 10 MG: 10 TABLET, FILM COATED ORAL at 11:04

## 2017-09-06 RX ADMIN — CELECOXIB 200 MG: 200 CAPSULE ORAL at 09:38

## 2017-09-06 RX ADMIN — DOCUSATE SODIUM 100 MG: 100 CAPSULE, LIQUID FILLED ORAL at 09:39

## 2017-09-06 RX ADMIN — SODIUM CHLORIDE 2 G: 9 INJECTION, SOLUTION INTRAVENOUS at 00:22

## 2017-09-06 RX ADMIN — OXYCODONE HYDROCHLORIDE 5 MG: 5 TABLET ORAL at 16:33

## 2017-09-06 ASSESSMENT — GAIT ASSESSMENTS
GAIT LEVEL OF ASSIST: SUPERVISED
DEVIATION: DECREASED HEEL STRIKE;DECREASED TOE OFF
ASSISTIVE DEVICE: CRUTCHES
DISTANCE (FEET): 150

## 2017-09-06 ASSESSMENT — PATIENT HEALTH QUESTIONNAIRE - PHQ9
SUM OF ALL RESPONSES TO PHQ QUESTIONS 1-9: 0
SUM OF ALL RESPONSES TO PHQ9 QUESTIONS 1 AND 2: 0
2. FEELING DOWN, DEPRESSED, IRRITABLE, OR HOPELESS: NOT AT ALL
1. LITTLE INTEREST OR PLEASURE IN DOING THINGS: NOT AT ALL

## 2017-09-06 ASSESSMENT — PAIN SCALES - GENERAL
PAINLEVEL_OUTOF10: 5
PAINLEVEL_OUTOF10: 5

## 2017-09-06 ASSESSMENT — ACTIVITIES OF DAILY LIVING (ADL): TOILETING: INDEPENDENT

## 2017-09-06 NOTE — PROGRESS NOTES
Received report from off going RN, patient initially asleep but woke up easily. Pleasant & conversational. Patient's pain level is tolerable now and would like to wait until after breakfast before taking next pain med. Initial assessment completed, patient sitting up in bed with breakfast. Safety precautions in place. Patient will call for assistance with ambulation to bathroom.

## 2017-09-06 NOTE — CARE PLAN
Problem: Pain Management  Goal: Pain level will decrease to patient's comfort goal    Intervention: Educate and implement non-pharmacologic comfort measures. Examples: relaxation, distration, play therapy, activity therapy, massage, etc.  Patient progressing as expected. Walked, moved to chair to help with pain. Postponed pain med until after breakfast.      Problem: Mobility  Goal: Risk for activity intolerance will decrease  Patient progressing very well with ambulation. She is self-motivated and walks the yepez, sits in chair rather than bed, and ambulates to bathroom.

## 2017-09-06 NOTE — PROGRESS NOTES
Pt resting in bed, eyes closed, appears comfortable. No needs at this time, respirations even and unlabored.

## 2017-09-06 NOTE — THERAPY
"Occupational Therapy Evaluation completed.   Functional Status:  Pt seated up in chair upon OT arrival.  Pt reports she's had multiple knee surgeries in the past.  Pt reports she will have assist from son and parents for up to a week and then assist from friends or other family as needed.  Pt able to dress UB & LB with setup and SBA.  Toileting supervised.  Grooming standing at sink supervised.  Pt is mobilizing well, slightly impulsive, block still quite effective and pain not limiting her mobility at this time.  Educated pt on role of OT and Total Knee Replacement Protocol.  Reviewed application of precautions and use of Adaptive Equipment for dressing, bathing, toileting, grooming, kitchen activities and general functional mobility in the home. Reviewed the use of reacher, as well as shower chair and raised toilet seat to assist with ADL's.  Reviewed stall shower transfers. Provided pt with suggestions on where to obtain needed items. Educated on covering incision/dressing with extra plastic wrap and waterproof tape to ensure that incision does not get wet.  Educated on around the clock pain management strategies so that pt does not wake up in a pain crisis. Educated pt on having family setup home environment so things are easy to reach without bending or squatting. Pt verbalized understanding of all education provided.    Plan of Care: Patient with no further skilled OT needs in the acute care setting at this time  Discharge Recommendations:  Equipment: No Equipment Needed. Post-acute therapy Discharge to home with outpatient or home health for additional skilled therapy services.    See \"Rehab Therapy-Acute\" Patient Summary Report for complete documentation.    "

## 2017-09-06 NOTE — DISCHARGE SUMMARY
CHIEF COMPLAINT ON ADMISSION  Right knee pain. osteoarthritis    CODE STATUS  Full Code    HPI & HOSPITAL COURSE  This is a 56 y.o. female here with severe right knee osteoarthritis end-stage. Patient is postop day 1 status post right total knee arthroplasty. At time of discharge her vital signs are stable her hematocrit is 30.0. Her postoperative x-rays looked like good and she's been cleared by physical therapy. I've explained her the signs and symptoms of infection DVT and she is to contact me for any concerns or for any other problems. Home health and PT will be contacted to start visits hopefully within the next 1-2 days. Also at time of discharge she has no calf tenderness no pretibial swelling her dressing is clean and dry.    Therefore, she is discharged in good and stable condition with close outpatient follow-up.    SPECIFIC OUTPATIENT FOLLOW-UP  Patient's follow-up is already scheduled and she'll have home health and PT are to this.    DISCHARGE PROBLEM LIST  Active Problems:    Osteoarthritis of right knee POA: Unknown  Resolved Problems:    * No resolved hospital problems. *      FOLLOW UP  No future appointments.  Cayden Schmitz M.D.  [address] [facility] [id number]  [address]  [facility] [facility] [address]  [phone]    Go on 9/14/2017  Surgical wound re-check and follow up.      MEDICATIONS ON DISCHARGE   Coleen Clinton   Home Medication Instructions TORI:[id number]    Printed on:09/06/17 1602   Medication Information                      acetaminophen (TYLENOL) 500 MG Tab  Take 1,000 mg by mouth every 6 hours as needed.             docusate sodium (COLACE) 100 MG Cap  Take 100 mg by mouth 2 times a day.             Echinacea 400 MG Cap  Take  by mouth. Echinace with vitamin C,3 x a week             ibuprofen (MOTRIN) 200 MG Tab  Take 200 mg by mouth every 6 hours as needed.             Multiple Vitamins-Minerals (MULTIVITAMIN PO)  Take  by mouth every day.                 DIET  Orders Placed This Encounter   Procedures   • DIET  ORDER     Standing Status:   Standing     Number of Occurrences:   1     Order Specific Question:   Diet:     Answer:   Regular [1]       ACTIVITY  As tolerated.  Weight bearing as tolerated      CONSULTATIONS  None    PROCEDURES  Right total knee arthroplasty.    LABORATORY  Lab Results   Component Value Date/Time    SODIUM 139 08/18/2017 03:07 PM    POTASSIUM 3.7 08/18/2017 03:07 PM    CHLORIDE 106 08/18/2017 03:07 PM    CO2 27 08/18/2017 03:07 PM    GLUCOSE 103 (H) 08/18/2017 03:07 PM    BUN 17 08/18/2017 03:07 PM    CREATININE 0.87 08/18/2017 03:07 PM        Lab Results   Component Value Date/Time    WBC 10.7 09/06/2017 04:49 AM    HEMOGLOBIN 10.1 (L) 09/06/2017 04:49 AM    HEMATOCRIT 29.5 (L) 09/06/2017 04:49 AM    PLATELETCT 253 09/06/2017 04:49 AM        Total time of the discharge process exceeds 31 minutes

## 2017-09-06 NOTE — DISCHARGE INSTRUCTIONS
Dry dressing change qday, start tomorrow, leave open when dry, call if drainage greater than 48 hours.   followup appt made,  Pain meds given pre op   WBAT,  Brace on when out of house   Call for fever, chills, increasing pain or any other concerns.       Discharge Instructions    Discharged to home by car with relative. Discharged via wheelchair, hospital escort: Yes.  Special equipment needed: Not Applicable    Be sure to schedule a follow-up appointment with your primary care doctor or any specialists as instructed.     Discharge Plan:   Diet Plan: Discussed  Activity Level: Discussed  Influenza Vaccine Indication: Patient Refuses    I understand that a diet low in cholesterol, fat, and sodium is recommended for good health. Unless I have been given specific instructions below for another diet, I accept this instruction as my diet prescription.   Other diet: Regular    Special Instructions: Discharge instructions for the Orthopedic Patient    Follow up with Primary Care Physician within 2 weeks of discharge to home, regarding:  Review of medications and diagnostic testing.  Surveillance for medical complications.  Workup and treatment of osteoporosis, if appropriate.     -Is this a Joint Replacement patient? Yes Total Joint Knee Replacement Discharge Instructions    Pain  - The goal is to slowly wean off the prescription pain medicine.  - Ice can be used for pain control.  20 minutes at a time is recommended, and never directly against your skin or incision.  - Most patients are off the pain pills by 3 weeks; others may require a low level of pain medications for many months.  If your pain continues to be severe, follow up with your physician.  Infection    Knee joint infections; occur in fewer than 2% of patients. The most common causes of infection following total knee replacement surgery are from bacteria that enter the bloodstream during dental procedures, urinary tract infections, or skin infections. These  bacteria can lodge around your knee replacement and cause an infection.  - Keep the incision as clean and dry as possible.  - Always wash your hands before touching your incision.  - Skin infections tend to develop around 7-10 days after surgery; most can be treated with oral antibiotics.  - Dental Care should be delayed for 3 months after surgery, your surgeon recommends taking a dose of antibiotics 1 hour prior to any dental procedure. After 2 years, most surgeons recommend antibiotics only before an extensive procedure.  Ask your surgeon what he recommends.  - Signs and symptoms of infection can include:  low grade fever, redness, pain, swelling and drainage from your incision.  Notify your surgeon immediately if you develop any of these symptoms.  Other instructions  - Bowel habits - constipation is extremely common and is caused by a combination of anesthesia, lack of mobility and pain medicine.  Use stool softeners or laxatives if necessary. It is important not to ignore this problem, as bowel obstructions can be a serious complication after joint replacement surgery.  - Mood/Energy Level - Many patients experience a lack of energy and endurance for up to 2-3 months after surgery.  Some may also feel down and can even become depressed.  This is likely due to the postoperative anemia, change in activity level, lack of sleep, pain medicine and just the emotional reaction to the surgery itself that is a big disruption in a person’s life.  This usually passes.  If symptoms persist, follow up with your primary physician.  - Returning to work - Your surgeon will give you more specific instructions. Depending on the type of activities you perform, it may be 6 to 8 weeks before you return to work.   Generally, if you work a sedentary job requiring little standing or walking, most patients may return within 2-6 weeks.  Manual labor jobs involving walking, lifting and standing may take longer. Your surgeon’s office can  provide a release to part-time or light duty work early on in your recovery and progress you to full duty as able.    - Driving - If your left knee was replaced and you have an automatic transmission, you may be able to begin driving in a week or so, provided you are no longer taking narcotic pain medication. If your right knee was replaced, avoid driving for 6 to 8 weeks. Remember that your reflexes may not be as sharp as before your surgery. Ask your surgeon for specific instructions.   - Avoiding falls - A fall during the first few weeks after surgery can damage your new knee and may result in a need for further surgery.   throw rugs and tack down loose carpeting.  Be aware of floor hazards such as pets, small objects or uneven surfaces.    - [employer] Metal Detectors - The sensitivity of metal detectors varies and it is likely that your prosthesis will cause an alarm.  Inform the  of your artificial joint.  Diet  - Resume your normal diet as tolerated.  - It is important to achieve a healthy nutritional status by eating a well balanced diet on a regular basis.  - Your physician may recommend that you take iron and vitamin supplements.   - Continue to drink plenty of fluids.  Shower/Bathing  - You may shower as soon as you get home from the hospital unless otherwise instructed.  - Keep your incision out of water.  To keep the incision dry when showering, cover it with a plastic bag or plastic wrap.  - Pat incision dry if it gets wet.  Don’t rub.  - Do not submerge in a bath until staples are out and the incision is completely healed. (Approximately 6-8 weeks)  Dressing Change:  Procedure (if recommended by your physician)  - Wash hands.  - Open all new dressing change materials.  - Remove old dressing and discard.  - Inspect incision for redness, increase in clear drainage, yellow/green drainage, odor and surrounding skin hot to touch.  -  ABD (large gauze) pad or “island dressing” by  one corner and lay over the incision.  Be careful not to touch the inside of the dressing that will lay over the incision.  - Secure in place as instructed (Ace wrap or tape).    Swelling/Bruising    - Swelling can last from 3-6 months.  - Elevate your leg higher than your heart while reclining.   The first week you are home you should elevate your leg an equal amount of time, as you are active.    - Anti-inflammatory pills can be taken once you have stopped the blood thinners.  - The swelling is usually worse after you go home since you are upright for longer periods of time.  - Bruising is common and can involve the entire leg including the thigh, calf and even foot. Bruising often does not appear until after you arrive home and it can be quite dramatic- purple, black, and green.  The bruising you can see is not usually concerning and will subside without any treatment.      Blood Clot Prevention  Blood clots in the legs and the less common, but frightening, clots that travel to the lungs are a real focus of our preventative. Most patients are at standard risk for them, but those patients who are at higher risk include people who have had previous clots, a family history of clotting, smoking, diabetes, obesity, advanced age, use of estrogen and a sedentary lifestyle.    - Signs of blood clots in legs - Swelling in thigh, calf or ankle that does not go down with elevation.  Pain, heat and tenderness in calf, back of calf or groin area.  NOTE: blood clots can occur in either leg.  - You have been receiving anticoagulant therapy (blood thinners) in the hospital and you may be instructed to continue at home depending on your risk factors.  - Your risk for developing a clot continues for up to 2-3 months after surgery.  You should avoid prolonged sitting and dehydration during that time (long air trips and car trips).  If you do take a trip during this time, please get up and move around every 1- 1.5 hours.  - If you  are prescribed blood-thinning medication for home, follow instructions as directed. (Handouts provided if applicable).      Activity  Once home, you should continue to stay active. The key is to remember not to overdo it! While you can expect some good days and some bad days, you should notice a gradual improvement and a gradual increase in your endurance over the next 6 to 12 months. Exercise is a critical component of home care, particularly during the first few weeks after surgery.     - Normal activities of daily living You should be able to resume most within 3 to 6 weeks following surgery. Some pain with activity and at night is common for several weeks after surgery  -  Physical Therapy Exercises - Continue to do the exercises prescribed for at least two months after surgery. Riding a stationary bicycle can help maintain muscle tone and keep your knee flexible. Try to achieve the maximum degree of bending and extension possible. (handout provided by Therapist).  - Sexual Activity -. Your surgeon can tell you when it safe to resume sexual activity.    - Sleeping Positions - You can safely sleep on your back, on either side, or on your stomach.   - Other Activities - Walk as much as you like, but remember that walking is no substitute for the exercises your doctor and physical therapist will prescribe. Lower impact activities are preferred.  If you have specific questions, consult your Surgeon.    When to Call the Doctor   Call the physician if:   - Fever over 100.5? F  - Increased pain, drainage, redness, odor or heat around the incision area  - Shaking chills  - Increased knee pain with activity and rest  - Increased pain in calf, tenderness or redness above or below the knee  - Increased swelling of calf, ankle, foot  - Sudden increased shortness of breath, sudden onset of chest pain, localized chest pain with coughing  - Incision opening  Or, if there are any questions or concerns about medications or care.        -Is this patient being discharged with medication to prevent blood clots?  Yes, Xarelto Rivaroxaban oral tablets  What is this medicine?  RIVAROXABAN (ri va TIGRE a ban) is an anticoagulant (blood thinner). It is used to treat blood clots in the lungs or in the veins. It is also used after knee or hip surgeries to prevent blood clots. It is also used to lower the chance of stroke in people with a medical condition called atrial fibrillation.  This medicine may be used for other purposes; ask your health care provider or pharmacist if you have questions.  COMMON BRAND NAME(S): Xarelto  What should I tell my health care provider before I take this medicine?  They need to know if you have any of these conditions:  -bleeding disorders  -bleeding in the brain  -blood in your stools (black or tarry stools) or if you have blood in your vomit  -history of stomach bleeding  -kidney disease  -liver disease  -low blood counts, like low white cell, platelet, or red cell counts  -recent or planned spinal or epidural procedure  -take medicines that treat or prevent blood clots  -an unusual or allergic reaction to rivaroxaban, other medicines, foods, dyes, or preservatives  -pregnant or trying to get pregnant  -breast-feeding  How should I use this medicine?  Take this medicine by mouth with a glass of water. Follow the directions on the prescription label. Take your medicine at regular intervals. Do not take it more often than directed. Do not stop taking except on your doctor's advice.  If you are taking this medicine after hip or knee replacement surgery, take it with or without food.  If you are taking this medicine for atrial fibrillation, take it with your evening meal. If you are taking this medicine to treat blood clots, take it with food at the same time each day. If you are unable to swallow your tablet, you may crush the tablet and mix it in applesauce. Then, immediately eat the applesauce. You should eat more food  right after you eat the applesauce containing the crushed tablet.  Talk to your pediatrician regarding the use of this medicine in children. Special care may be needed.  Overdosage: If you think you've taken too much of this medicine contact a poison control center or emergency room at once.  Overdosage: If you think you have taken too much of this medicine contact a poison control center or emergency room at once.  NOTE: This medicine is only for you. Do not share this medicine with others.  What if I miss a dose?  If you take your medicine once a day and miss a dose, take the missed dose as soon as you remember. If you take your medicine twice a day and miss a dose, take the missed dose immediately. In this instance, 2 tablets may be taken at the same time. The next day you should take 1 tablet twice a day as directed.  What may interact with this medicine?  -aspirin and aspirin-like medicines  -certain antibiotics like erythromycin, azithromycin, and clarithromycin  -certain medicines for fungal infections like ketoconazole and itraconazole  -certain medicines for irregular heart beat like amiodarone, quinidine, dronedarone  -certain medicines for seizures like carbamazepine, phenytoin  -certain medicines that treat or prevent blood clots like warfarin, enoxaparin, and dalteparin   -conivaptan  -diltiazem  -felodipine  -indinavir  -lopinavir; ritonavir  -NSAIDS, medicines for pain and inflammation, like ibuprofen or naproxen  -ranolazine  -rifampin  -ritonavir  -Michelle's wort  -verapamil  This list may not describe all possible interactions. Give your health care provider a list of all the medicines, herbs, non-prescription drugs, or dietary supplements you use. Also tell them if you smoke, drink alcohol, or use illegal drugs. Some items may interact with your medicine.  What should I watch for while using this medicine?  Do not stop taking this medicine without first talking to your doctor. Stopping this  medicine may increase your risk of having a stroke. Be sure to refill your prescription before you run out of medicine.  This medicine may increase your risk to bruise or bleed. Call your doctor or health care professional if you notice any unusual bleeding.  Be careful brushing and flossing your teeth or using a toothpick because you may bleed more easily. If you have any dental work done, tell your dentist you are receiving this medicine.  What side effects may I notice from receiving this medicine?  Side effects that you should report to your doctor or health care professional as soon as possible:  -allergic reactions like skin rash, itching or hives, swelling of the face, lips, or tongue  -back pain  -bloody or black, tarry stools  -changes in vision  -confusion, trouble speaking or understanding  -red or dark-brown urine  -redness, blistering, peeling or loosening of the skin, including inside the mouth  -severe headaches  -spitting up blood or brown material that looks like coffee grounds  -sudden numbness or weakness of the face, arm or leg  -trouble walking, dizziness, loss of balance or coordination  -unusual bruising or bleeding from the eye, gums, or nose   Side effects that usually do not require medical attention (Report these to your doctor or health care professional if they continue or are bothersome.):  -dizziness  -muscle pain  This list may not describe all possible side effects. Call your doctor for medical advice about side effects. You may report side effects to FDA at 4-065-FDA-0122.  Where should I keep my medicine?  Keep out of the reach of children.  Store at room temperature between 15 and 30 degrees C (59 and 86 degrees F). Throw away any unused medicine after the expiration date.  NOTE: This sheet is a summary. It may not cover all possible information. If you have questions about this medicine, talk to your doctor, pharmacist, or health care provider.  © 2014, [employer].  (3/17/2014 3:32:09 PM)      · Is patient discharged on Warfarin / Coumadin?   No     · Is patient Post Blood Transfusion?  No    Depression / Suicide Risk    As you are discharged from this [facility] facility, it is important to learn how to keep safe from harming yourself.    Recognize the warning signs:  · Abrupt changes in personality, positive or negative- including increase in energy   · Giving away possessions  · Change in eating patterns- significant weight changes-  positive or negative  · Change in sleeping patterns- unable to sleep or sleeping all the time   · Unwillingness or inability to communicate  · Depression  · Unusual sadness, discouragement and loneliness  · Talk of wanting to die  · Neglect of personal appearance   · Rebelliousness- reckless behavior  · Withdrawal from people/activities they love  · Confusion- inability to concentrate     If you or a loved one observes any of these behaviors or has concerns about self-harm, here's what you can do:  · Talk about it- your feelings and reasons for harming yourself  · Remove any means that you might use to hurt yourself (examples: pills, rope, extension cords, firearm)  · Get professional help from the community (Mental Health, Substance Abuse, psychological counseling)  · Do not be alone:Call your Safe Contact- someone whom you trust who will be there for you.  · Call your local CRISIS HOTLINE 385-7399 or 976-781-5891  · Call your local Children's Mobile Crisis Response Team [place] (957) 197-5005 or www.[employer]  · Call the toll free National Suicide Prevention Hotlines   · National Suicide Prevention Lifeline 653-381-EGXW (3499)  · National Hope Line Network 800-SUICIDE (862-0896)

## 2017-09-06 NOTE — THERAPY
"Physical Therapy Treatment completed.   Bed Mobility:  Supine to Sit:  (NT)  Transfers: Sit to Stand: Supervised  Gait: Level Of Assist: Supervised with Crutches       Plan of Care: Patient with no further skilled PT needs in the acute care setting at this time, steady with crutches or FWW, HEP reviewed.   Discharge Recommendations: Equipment: No Equipment Needed. Post-acute therapy Discharge to home with outpatient or home health for additional skilled therapy services.     See \"Rehab Therapy-Acute\" Patient Summary Report for complete documentation.       "

## 2017-09-06 NOTE — PROGRESS NOTES
Hemovac drain removed, pt tolerated well. Pt states pain 3/10 and at a tolerable level, denies pain medication. Will cont with care.

## 2017-09-06 NOTE — CARE PLAN
Problem: Safety  Goal: Will remain free from falls  Outcome: PROGRESSING AS EXPECTED  Pt educated on fall risks and to call staff before getting out of bed. Fall precautions in place, hourly rounding implemented, bed alarm on, call light in reach.      Problem: Knowledge Deficit  Goal: Knowledge of disease process/condition, treatment plan, diagnostic tests, and medications will improve  Outcome: PROGRESSING AS EXPECTED  Discussed POC with pt, answered all questions, educated pt on medications and side effects. Pt verbalized understanding.

## 2017-09-06 NOTE — PROGRESS NOTES
Received report from day RN. Pt ambulated hallways now resting in bed no signs of distress. VSS. AOx4 discussed POC, pt denies needs at this time, states pain is at a tolerable level. Polar ice and SCDs in place. Educated on fall risks and to use call light before getting out of bed. Fall precautions in place, call light and personal belongings in reach will monitor.

## 2017-09-07 NOTE — PROGRESS NOTES
Patient was given discharge instructions including follow up information and education in medications and pain management. Patient expressed understanding and was discharged into the care of her son,&  escorted via wheelchair by CNA.

## 2017-09-19 NOTE — ADDENDUM NOTE
Encounter addended by: Obdulia Mendez R.N. on: 9/19/2017 11:15 AM<BR>    Actions taken: Flowsheet accepted

## 2018-03-28 NOTE — PROGRESS NOTES
2 RN skin assessment done; skin not WDL d/t planned L total knee. See Wound flowsheet.   pt. d/c by MD

## 2019-01-23 ENCOUNTER — HOSPITAL ENCOUNTER (OUTPATIENT)
Dept: RADIOLOGY | Facility: MEDICAL CENTER | Age: 59
End: 2019-01-23
Attending: FAMILY MEDICINE
Payer: COMMERCIAL

## 2019-01-23 DIAGNOSIS — N63.0 LUMP OR MASS IN BREAST: ICD-10-CM

## 2019-01-23 DIAGNOSIS — N95.9 MENOPAUSAL AND PERIMENOPAUSAL DISORDER: ICD-10-CM

## 2019-01-23 PROCEDURE — G0279 TOMOSYNTHESIS, MAMMO: HCPCS

## 2019-01-23 PROCEDURE — 76642 ULTRASOUND BREAST LIMITED: CPT | Mod: LT

## 2019-01-23 PROCEDURE — 77080 DXA BONE DENSITY AXIAL: CPT

## 2020-03-25 ENCOUNTER — APPOINTMENT (OUTPATIENT)
Dept: RADIOLOGY | Facility: MEDICAL CENTER | Age: 60
End: 2020-03-25
Attending: FAMILY MEDICINE
Payer: COMMERCIAL

## 2020-10-13 ENCOUNTER — HOSPITAL ENCOUNTER (OUTPATIENT)
Dept: RADIOLOGY | Facility: MEDICAL CENTER | Age: 60
End: 2020-10-13
Attending: FAMILY MEDICINE
Payer: COMMERCIAL

## 2020-10-13 DIAGNOSIS — Z12.31 VISIT FOR SCREENING MAMMOGRAM: ICD-10-CM

## 2020-10-13 PROCEDURE — 77067 SCR MAMMO BI INCL CAD: CPT | Mod: 50

## 2021-10-04 ENCOUNTER — TELEPHONE (OUTPATIENT)
Dept: HEALTH INFORMATION MANAGEMENT | Facility: OTHER | Age: 61
End: 2021-10-04

## 2021-11-24 ENCOUNTER — HOSPITAL ENCOUNTER (OUTPATIENT)
Dept: RADIOLOGY | Facility: MEDICAL CENTER | Age: 61
End: 2021-11-24
Attending: FAMILY MEDICINE
Payer: COMMERCIAL

## 2021-11-24 DIAGNOSIS — Z12.31 VISIT FOR SCREENING MAMMOGRAM: ICD-10-CM

## 2021-12-22 ENCOUNTER — APPOINTMENT (OUTPATIENT)
Dept: RADIOLOGY | Facility: MEDICAL CENTER | Age: 61
DRG: 087 | End: 2021-12-22
Attending: EMERGENCY MEDICINE
Payer: COMMERCIAL

## 2021-12-22 ENCOUNTER — HOSPITAL ENCOUNTER (INPATIENT)
Facility: MEDICAL CENTER | Age: 61
LOS: 1 days | DRG: 087 | End: 2021-12-23
Attending: EMERGENCY MEDICINE | Admitting: SURGERY
Payer: COMMERCIAL
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
## 2021-12-22 DIAGNOSIS — S09.90XA CLOSED HEAD INJURY, INITIAL ENCOUNTER: ICD-10-CM

## 2021-12-22 DIAGNOSIS — S06.0X1A CONCUSSION WITH LOSS OF CONSCIOUSNESS OF 30 MINUTES OR LESS, INITIAL ENCOUNTER: ICD-10-CM

## 2021-12-22 DIAGNOSIS — S02.119A CLOSED FRACTURE OF LEFT SIDE OF OCCIPITAL BONE, UNSPECIFIED OCCIPITAL FRACTURE TYPE, INITIAL ENCOUNTER (HCC): ICD-10-CM

## 2021-12-22 PROBLEM — Z53.09 CONTRAINDICATION TO DEEP VEIN THROMBOSIS (DVT) PROPHYLAXIS: Status: ACTIVE | Noted: 2021-12-22

## 2021-12-22 PROBLEM — T14.90XA TRAUMA: Status: ACTIVE | Noted: 2021-12-22

## 2021-12-22 PROBLEM — E03.9 HYPOTHYROID: Status: ACTIVE | Noted: 2021-12-22

## 2021-12-22 PROBLEM — S06.9X9A FRACTURE OF OCCIPITAL BONE OF SKULL WITH LOSS OF CONSCIOUSNESS (HCC): Status: ACTIVE | Noted: 2021-12-22

## 2021-12-22 PROBLEM — Z11.52 ENCOUNTER FOR SCREENING FOR COVID-19: Status: ACTIVE | Noted: 2021-12-22

## 2021-12-22 LAB
ALBUMIN SERPL BCP-MCNC: 3.9 G/DL (ref 3.2–4.9)
ALBUMIN/GLOB SERPL: 1.4 G/DL
ALP SERPL-CCNC: 48 U/L (ref 30–99)
ALT SERPL-CCNC: 16 U/L (ref 2–50)
ANION GAP SERPL CALC-SCNC: 12 MMOL/L (ref 7–16)
AST SERPL-CCNC: 19 U/L (ref 12–45)
BASOPHILS # BLD AUTO: 0.1 % (ref 0–1.8)
BASOPHILS # BLD: 0.01 K/UL (ref 0–0.12)
BILIRUB SERPL-MCNC: 0.8 MG/DL (ref 0.1–1.5)
BUN SERPL-MCNC: 13 MG/DL (ref 8–22)
CALCIUM SERPL-MCNC: 8.7 MG/DL (ref 8.5–10.5)
CFT BLD TEG: 3.2 MIN (ref 4.6–9.1)
CFT P HPASE BLD TEG: 2.7 MIN (ref 4.3–8.3)
CHLORIDE SERPL-SCNC: 104 MMOL/L (ref 96–112)
CLOT ANGLE BLD TEG: 77.7 DEGREES (ref 63–78)
CLOT LYSIS 30M P MA LENFR BLD TEG: 0.2 % (ref 0–2.6)
CO2 SERPL-SCNC: 21 MMOL/L (ref 20–33)
CREAT SERPL-MCNC: 0.74 MG/DL (ref 0.5–1.4)
CT.EXTRINSIC BLD ROTEM: 0.9 MIN (ref 0.8–2.1)
EOSINOPHIL # BLD AUTO: 0 K/UL (ref 0–0.51)
EOSINOPHIL NFR BLD: 0 % (ref 0–6.9)
ERYTHROCYTE [DISTWIDTH] IN BLOOD BY AUTOMATED COUNT: 42.8 FL (ref 35.9–50)
GLOBULIN SER CALC-MCNC: 2.7 G/DL (ref 1.9–3.5)
GLUCOSE SERPL-MCNC: 108 MG/DL (ref 65–99)
HCT VFR BLD AUTO: 35.7 % (ref 37–47)
HGB BLD-MCNC: 12.1 G/DL (ref 12–16)
IMM GRANULOCYTES # BLD AUTO: 0.04 K/UL (ref 0–0.11)
IMM GRANULOCYTES NFR BLD AUTO: 0.3 % (ref 0–0.9)
LYMPHOCYTES # BLD AUTO: 1.41 K/UL (ref 1–4.8)
LYMPHOCYTES NFR BLD: 11.8 % (ref 22–41)
MAGNESIUM SERPL-MCNC: 1.9 MG/DL (ref 1.5–2.5)
MCF BLD TEG: 64.2 MM (ref 52–69)
MCF.PLATELET INHIB BLD ROTEM: 23.3 MM (ref 15–32)
MCH RBC QN AUTO: 30.9 PG (ref 27–33)
MCHC RBC AUTO-ENTMCNC: 33.9 G/DL (ref 33.6–35)
MCV RBC AUTO: 91.1 FL (ref 81.4–97.8)
MONOCYTES # BLD AUTO: 0.3 K/UL (ref 0–0.85)
MONOCYTES NFR BLD AUTO: 2.5 % (ref 0–13.4)
NEUTROPHILS # BLD AUTO: 10.2 K/UL (ref 2–7.15)
NEUTROPHILS NFR BLD: 85.3 % (ref 44–72)
NRBC # BLD AUTO: 0 K/UL
NRBC BLD-RTO: 0 /100 WBC
PA AA BLD-ACNC: 12.1 % (ref 0–11)
PA ADP BLD-ACNC: 23.5 % (ref 0–17)
PLATELET # BLD AUTO: 325 K/UL (ref 164–446)
PMV BLD AUTO: 8.8 FL (ref 9–12.9)
POTASSIUM SERPL-SCNC: 3.9 MMOL/L (ref 3.6–5.5)
PROT SERPL-MCNC: 6.6 G/DL (ref 6–8.2)
RBC # BLD AUTO: 3.92 M/UL (ref 4.2–5.4)
SODIUM SERPL-SCNC: 137 MMOL/L (ref 135–145)
TEG ALGORITHM TGALG: ABNORMAL
WBC # BLD AUTO: 12 K/UL (ref 4.8–10.8)

## 2021-12-22 PROCEDURE — 85384 FIBRINOGEN ACTIVITY: CPT

## 2021-12-22 PROCEDURE — 85025 COMPLETE CBC W/AUTO DIFF WBC: CPT

## 2021-12-22 PROCEDURE — 700111 HCHG RX REV CODE 636 W/ 250 OVERRIDE (IP): Performed by: EMERGENCY MEDICINE

## 2021-12-22 PROCEDURE — 87426 SARSCOV CORONAVIRUS AG IA: CPT

## 2021-12-22 PROCEDURE — 72125 CT NECK SPINE W/O DYE: CPT

## 2021-12-22 PROCEDURE — 700111 HCHG RX REV CODE 636 W/ 250 OVERRIDE (IP): Performed by: SURGERY

## 2021-12-22 PROCEDURE — 83735 ASSAY OF MAGNESIUM: CPT

## 2021-12-22 PROCEDURE — 99291 CRITICAL CARE FIRST HOUR: CPT

## 2021-12-22 PROCEDURE — 70450 CT HEAD/BRAIN W/O DYE: CPT

## 2021-12-22 PROCEDURE — 99222 1ST HOSP IP/OBS MODERATE 55: CPT | Mod: AI | Performed by: SURGERY

## 2021-12-22 PROCEDURE — 85576 BLOOD PLATELET AGGREGATION: CPT

## 2021-12-22 PROCEDURE — 80053 COMPREHEN METABOLIC PANEL: CPT

## 2021-12-22 PROCEDURE — A9270 NON-COVERED ITEM OR SERVICE: HCPCS | Performed by: SPECIALIST

## 2021-12-22 PROCEDURE — A9270 NON-COVERED ITEM OR SERVICE: HCPCS | Performed by: SURGERY

## 2021-12-22 PROCEDURE — 96374 THER/PROPH/DIAG INJ IV PUSH: CPT

## 2021-12-22 PROCEDURE — 700102 HCHG RX REV CODE 250 W/ 637 OVERRIDE(OP): Performed by: SURGERY

## 2021-12-22 PROCEDURE — A9270 NON-COVERED ITEM OR SERVICE: HCPCS | Performed by: NURSE PRACTITIONER

## 2021-12-22 PROCEDURE — 96375 TX/PRO/DX INJ NEW DRUG ADDON: CPT

## 2021-12-22 PROCEDURE — 700102 HCHG RX REV CODE 250 W/ 637 OVERRIDE(OP): Performed by: SPECIALIST

## 2021-12-22 PROCEDURE — 700105 HCHG RX REV CODE 258: Performed by: SURGERY

## 2021-12-22 PROCEDURE — 85347 COAGULATION TIME ACTIVATED: CPT

## 2021-12-22 PROCEDURE — 770022 HCHG ROOM/CARE - ICU (200)

## 2021-12-22 PROCEDURE — 700102 HCHG RX REV CODE 250 W/ 637 OVERRIDE(OP): Performed by: NURSE PRACTITIONER

## 2021-12-22 RX ORDER — AMOXICILLIN 250 MG
1 CAPSULE ORAL NIGHTLY
Status: DISCONTINUED | OUTPATIENT
Start: 2021-12-22 | End: 2021-12-23 | Stop reason: HOSPADM

## 2021-12-22 RX ORDER — ATORVASTATIN CALCIUM 10 MG/1
10 TABLET, FILM COATED ORAL EVERY EVENING
Status: DISCONTINUED | OUTPATIENT
Start: 2021-12-22 | End: 2021-12-23 | Stop reason: HOSPADM

## 2021-12-22 RX ORDER — POLYETHYLENE GLYCOL 3350 17 G/17G
1 POWDER, FOR SOLUTION ORAL 2 TIMES DAILY
Status: DISCONTINUED | OUTPATIENT
Start: 2021-12-22 | End: 2021-12-23 | Stop reason: HOSPADM

## 2021-12-22 RX ORDER — MORPHINE SULFATE 4 MG/ML
4 INJECTION INTRAVENOUS
Status: DISCONTINUED | OUTPATIENT
Start: 2021-12-22 | End: 2021-12-23

## 2021-12-22 RX ORDER — MORPHINE SULFATE 4 MG/ML
2 INJECTION INTRAVENOUS
Status: DISCONTINUED | OUTPATIENT
Start: 2021-12-22 | End: 2021-12-23

## 2021-12-22 RX ORDER — ENEMA 19; 7 G/133ML; G/133ML
1 ENEMA RECTAL
Status: DISCONTINUED | OUTPATIENT
Start: 2021-12-22 | End: 2021-12-23 | Stop reason: HOSPADM

## 2021-12-22 RX ORDER — LEVOTHYROXINE SODIUM 25 UG/1
25 TABLET ORAL EVERY MORNING
COMMUNITY
Start: 2021-11-11

## 2021-12-22 RX ORDER — ONDANSETRON 2 MG/ML
4 INJECTION INTRAMUSCULAR; INTRAVENOUS ONCE
Status: COMPLETED | OUTPATIENT
Start: 2021-12-22 | End: 2021-12-22

## 2021-12-22 RX ORDER — OXYCODONE HYDROCHLORIDE 5 MG/1
5 TABLET ORAL
Status: DISCONTINUED | OUTPATIENT
Start: 2021-12-22 | End: 2021-12-23

## 2021-12-22 RX ORDER — LEVOTHYROXINE SODIUM 0.03 MG/1
25 TABLET ORAL EVERY MORNING
Status: DISCONTINUED | OUTPATIENT
Start: 2021-12-23 | End: 2021-12-23 | Stop reason: HOSPADM

## 2021-12-22 RX ORDER — ATORVASTATIN CALCIUM 10 MG/1
10 TABLET, FILM COATED ORAL EVERY EVENING
COMMUNITY
Start: 2021-10-22

## 2021-12-22 RX ORDER — BISACODYL 10 MG
10 SUPPOSITORY, RECTAL RECTAL
Status: DISCONTINUED | OUTPATIENT
Start: 2021-12-22 | End: 2021-12-23 | Stop reason: HOSPADM

## 2021-12-22 RX ORDER — FAMOTIDINE 20 MG/1
20 TABLET, FILM COATED ORAL 2 TIMES DAILY
Status: DISCONTINUED | OUTPATIENT
Start: 2021-12-22 | End: 2021-12-23

## 2021-12-22 RX ORDER — AMOXICILLIN 250 MG
1 CAPSULE ORAL
Status: DISCONTINUED | OUTPATIENT
Start: 2021-12-22 | End: 2021-12-23 | Stop reason: HOSPADM

## 2021-12-22 RX ORDER — ONDANSETRON 2 MG/ML
4 INJECTION INTRAMUSCULAR; INTRAVENOUS EVERY 4 HOURS PRN
Status: DISCONTINUED | OUTPATIENT
Start: 2021-12-22 | End: 2021-12-23 | Stop reason: HOSPADM

## 2021-12-22 RX ORDER — OXYCODONE HYDROCHLORIDE 5 MG/1
2.5 TABLET ORAL
Status: DISCONTINUED | OUTPATIENT
Start: 2021-12-22 | End: 2021-12-23

## 2021-12-22 RX ORDER — ONDANSETRON 4 MG/1
4 TABLET, ORALLY DISINTEGRATING ORAL EVERY 4 HOURS PRN
Status: DISCONTINUED | OUTPATIENT
Start: 2021-12-22 | End: 2021-12-23 | Stop reason: HOSPADM

## 2021-12-22 RX ORDER — ACETAMINOPHEN 325 MG/1
650 TABLET ORAL EVERY 4 HOURS PRN
Status: DISCONTINUED | OUTPATIENT
Start: 2021-12-22 | End: 2021-12-23 | Stop reason: HOSPADM

## 2021-12-22 RX ORDER — DOCUSATE SODIUM 100 MG/1
100 CAPSULE, LIQUID FILLED ORAL 2 TIMES DAILY
Status: DISCONTINUED | OUTPATIENT
Start: 2021-12-22 | End: 2021-12-23 | Stop reason: HOSPADM

## 2021-12-22 RX ORDER — SODIUM CHLORIDE 9 MG/ML
INJECTION, SOLUTION INTRAVENOUS CONTINUOUS
Status: DISCONTINUED | OUTPATIENT
Start: 2021-12-22 | End: 2021-12-23

## 2021-12-22 RX ADMIN — ACETAMINOPHEN 650 MG: 325 TABLET, FILM COATED ORAL at 18:56

## 2021-12-22 RX ADMIN — ATORVASTATIN CALCIUM 10 MG: 10 TABLET, FILM COATED ORAL at 19:34

## 2021-12-22 RX ADMIN — SODIUM CHLORIDE: 9 INJECTION, SOLUTION INTRAVENOUS at 13:41

## 2021-12-22 RX ADMIN — MORPHINE SULFATE 2 MG: 4 INJECTION INTRAVENOUS at 15:39

## 2021-12-22 RX ADMIN — MORPHINE SULFATE 4 MG: 4 INJECTION INTRAVENOUS at 13:45

## 2021-12-22 RX ADMIN — FAMOTIDINE 20 MG: 20 TABLET ORAL at 17:02

## 2021-12-22 RX ADMIN — DOCUSATE SODIUM 50 MG AND SENNOSIDES 8.6 MG 1 TABLET: 8.6; 5 TABLET, FILM COATED ORAL at 19:34

## 2021-12-22 RX ADMIN — ACETAMINOPHEN 650 MG: 325 TABLET, FILM COATED ORAL at 23:24

## 2021-12-22 RX ADMIN — DOCUSATE SODIUM 100 MG: 100 CAPSULE ORAL at 17:02

## 2021-12-22 RX ADMIN — ONDANSETRON 4 MG: 2 INJECTION INTRAMUSCULAR; INTRAVENOUS at 12:30

## 2021-12-22 RX ADMIN — FENTANYL CITRATE 50 MCG: 50 INJECTION, SOLUTION INTRAMUSCULAR; INTRAVENOUS at 12:30

## 2021-12-22 ASSESSMENT — COGNITIVE AND FUNCTIONAL STATUS - GENERAL
SUGGESTED CMS G CODE MODIFIER MOBILITY: CH
SUGGESTED CMS G CODE MODIFIER DAILY ACTIVITY: CH
MOBILITY SCORE: 24
DAILY ACTIVITIY SCORE: 24

## 2021-12-22 ASSESSMENT — COPD QUESTIONNAIRES
HAVE YOU SMOKED AT LEAST 100 CIGARETTES IN YOUR ENTIRE LIFE: NO/DON'T KNOW
COPD SCREENING SCORE: 1
DURING THE PAST 4 WEEKS HOW MUCH DID YOU FEEL SHORT OF BREATH: NONE/LITTLE OF THE TIME
DO YOU EVER COUGH UP ANY MUCUS OR PHLEGM?: NO/ONLY WITH OCCASIONAL COLDS OR INFECTIONS

## 2021-12-22 ASSESSMENT — PATIENT HEALTH QUESTIONNAIRE - PHQ9
1. LITTLE INTEREST OR PLEASURE IN DOING THINGS: NOT AT ALL
SUM OF ALL RESPONSES TO PHQ9 QUESTIONS 1 AND 2: 0
2. FEELING DOWN, DEPRESSED, IRRITABLE, OR HOPELESS: NOT AT ALL

## 2021-12-22 NOTE — ED PROVIDER NOTES
ED Provider Note    CHIEF COMPLAINT  Chief Complaint   Patient presents with   • T-5000 FALL   • T-5000 Head Injury   • Neck Pain       HPI  Coleen Clinton is a 61 y.o. female who presents to the emergency department by ambulance after mechanical slip and fall with head injury, neck pain.  Patient was walking her dog and slipped and fell on the ice, landing backwards, striking her head on the ground.  She has a bump on the back of the head, laceration as well.  Positive LOC.  Assisted to feet and into a car by passersby, taken home where she was greeted by her son who called EMS.  Patient told EMS she was on Lovenox, but believes now she is on something instead for thyroid and or cholesterol.  No history for stroke, CAD, DVT or PE.    REVIEW OF SYSTEMS  See HPI for further details. All other systems are negative.     PAST MEDICAL HISTORY   has a past medical history of Anesthesia, Dyslipidemia, Other specified symptom associated with female genital organs, and Snoring.    SOCIAL HISTORY  Social History     Tobacco Use   • Smoking status: Never Smoker   • Smokeless tobacco: Never Used   Substance and Sexual Activity   • Alcohol use: Yes     Comment: 1 per day wine   • Drug use: No   • Sexual activity: Not on file       SURGICAL HISTORY   has a past surgical history that includes gyn surgery (2008); other orthopedic surgery (2013); other orthopedic surgery (2009); other orthopedic surgery (2006); reconstruction, knee, acl, arthroscopic (6/16/2014); and knee arthroplasty total (Right, 9/5/2017).    CURRENT MEDICATIONS  Home Medications     Reviewed by Kitty Ferrari R.N. (Registered Nurse) on 12/22/21 at 1054  Med List Status: Partial   Medication Last Dose Status   acetaminophen (TYLENOL) 500 MG Tab  Active   docusate sodium (COLACE) 100 MG Cap  Active   Echinacea 400 MG Cap  Active   ibuprofen (MOTRIN) 200 MG Tab  Active   Multiple Vitamins-Minerals (MULTIVITAMIN PO)  Active                ALLERGIES  Allergies  "  Allergen Reactions   • Percocet [Oxycodone-Acetaminophen]    • Valium    • Vicodin [Hydrocodone-Acetaminophen]    • Codeine Vomiting     .       PHYSICAL EXAM  VITAL SIGNS: /62   Pulse 63   Temp 36.4 °C (97.5 °F) (Temporal)   Resp 20   Ht 1.626 m (5' 4\")   Wt 61.7 kg (136 lb)   LMP 05/11/2014   SpO2 98%   BMI 23.34 kg/m²   Pulse ox interpretation:I interpret this pulse ox as normal.  Constitutional: Alert in no apparent distress.  HENT: Normocephalic, atraumatic. Bilateral external ears normal, Nose normal. Moist mucous membranes.    Eyes: Pupils are equal and reactive, Conjunctiva normal.   Neck: Normal range of motion, Supple, non-tender. No stridor.   Lymphatic: No lymphadenopathy noted.   Cardiovascular: Regular rate and rhythm, no murmurs. Distal pulses intact.  No peripheral edema.  Thorax & Lungs: Normal breath sounds.  No wheezing/rales/ronchi. No increased work of breathing, clipped speech or retractions.   Abdomen: Soft, non-distended, non-tender to palpation. No palpable or pulsatile masses. No peritoneal signs. No CVA tenderness.  Skin: Warm, Dry, No erythema, No rash.   Musculoskeletal: Good range of motion in all major joints. No major deformities noted.   Neurologic: Alert , no gross focal deficit noted.  Psychiatric: Flat affect.  Anxious.  Repetitive and disoriented      DIAGNOSTIC STUDIES / PROCEDURES    RADIOLOGY  CT-HEAD W/O   Final Result      1.  Small left parietal scalp contusion.   2.  Nondisplaced left occipital skull fracture.   3.  No acute intracranial hemorrhage.                  CT-CSPINE WITHOUT PLUS RECONS   Final Result      1.  No acute fracture or dislocation cervical spine.   2.  Nondisplaced left occipital skull fracture.   3.  Multilevel disc degeneration.          COURSE & MEDICAL DECISION MAKING  Patient was seen evaluated me upon arrival at charge desk per TBI protocol for reported Lovenox use.  Palpable left occipital hematoma with superficial laceration.  " Midline neck pain, cervical collar placed at this time.  Tremulous, right upper and lower extremity more than left, but redirectable and nonfocal on complete exam.  She is concussed and slightly repetitive.  Add CT head, CT cervical spine.    Confirmed with patient, family after member at home reviewed patient's prescriptions that she is on Levoxyl (not Lovenox) and atorvastatin.  Also takes latanoprost eyedrops.    ED evaluation after mechanical slip and fall with head injury does demonstrate a left occipital skull fracture.  No intracranial hemorrhage.  She has clinical evidence for concussion, although repetition and thought process is improving.  Otherwise neurologically intact and nonfocal.  Initial tremor seems to have resolved with warmth and reassurance.  Pain, headache, improved with fentanyl.  Hemodynamically stable.    Nondisplaced skull fracture consistent with big 2 criteria.  Will discuss with trauma surgery.    12:25 PM Dr. Panda is aware the patient agreeable to consultation.    The total critical care time on this patient is 40 minutes, immediate and continuous hemodynamic monitoring and multiple bedside evaluations, resuscitating patient and assessing response to treatment, deciphering test results, speaking with admitting physician, and arranging for ICU hospital admission. This 40 minutes is exclusive of separately billable procedures.      FINAL IMPRESSION  (S02.119A) Closed fracture of left side of occipital bone, unspecified occipital fracture type, initial encounter ([facility])  (S09.90XA) Closed head injury, initial encounter  (S06.0X1A) Concussion with loss of consciousness of 30 minutes or less, initial encounter  (W19.XXXA) Fall, initial encounter      Electronically signed by: Josefina Reese D.O., 12/22/2021 10:59 AM      This dictation was created using voice recognition software. The accuracy of the dictation is limited to the abilities of the software. I expect there may be some errors  of grammar and possibly content. The nursing notes were reviewed and certain aspects of this information were incorporated into this note.

## 2021-12-22 NOTE — CARE PLAN
The patient is Watcher - Medium risk of patient condition declining or worsening    Shift Goals  Clinical Goals: neuro stablity  Patient Goals: pain control  Family Goals: comfort, updates    Progress made toward(s) clinical / shift goals:  Patient applying ice to posterior head, medicated per MAR for pain control, bed alarm on, educated to call for assistance.   Problem: Pain - Standard  Goal: Alleviation of pain or a reduction in pain to the patient’s comfort goal  Outcome: Progressing     Problem: Knowledge Deficit - Standard  Goal: Patient and family/care givers will demonstrate understanding of plan of care, disease process/condition, diagnostic tests and medications  Outcome: Progressing     Problem: Fall Risk  Goal: Patient will remain free from falls  Outcome: Progressing

## 2021-12-22 NOTE — ED NOTES
Bedside report given to ICU RN.  Pt transferred to unit via gurney with RN, monitor, and all belongings

## 2021-12-22 NOTE — PROGRESS NOTES
"       [facility] Trauma Progress Note    Briefly, this is a 61 y.o. female with a non displaced occipital skull fracture after slipping and falling on ice.    /75   Pulse 63   Temp 36.4 °C (97.5 °F) (Temporal)   Resp 18   Ht 1.626 m (5' 4\")   Wt 60.9 kg (134 lb 4.2 oz)   LMP 05/11/2014   SpO2 97%   BMI 23.05 kg/m²     No intake or output data in the 24 hours ending 12/22/21 1404    Lab Results   Component Value Date/Time    WBC 10.7 09/06/2017 04:49 AM    RBC 3.33 (L) 09/06/2017 04:49 AM    HEMOGLOBIN 10.1 (L) 09/06/2017 04:49 AM    HEMATOCRIT 29.5 (L) 09/06/2017 04:49 AM    MCV 88.6 09/06/2017 04:49 AM    MCH 30.3 09/06/2017 04:49 AM    MCHC 34.2 09/06/2017 04:49 AM    MPV 9.2 09/06/2017 04:49 AM    NEUTSPOLYS 58.9 03/21/2005 03:25 PM    LYMPHOCYTES 34.8 03/21/2005 03:25 PM    MONOCYTES 3.2 03/21/2005 03:25 PM    EOSINOPHILS 2.3 03/21/2005 03:25 PM    BASOPHILS 0.8 03/21/2005 03:25 PM        Lab Results   Component Value Date/Time    SODIUM 139 08/18/2017 03:07 PM    POTASSIUM 3.7 08/18/2017 03:07 PM    CHLORIDE 106 08/18/2017 03:07 PM    CO2 27 08/18/2017 03:07 PM    GLUCOSE 103 (H) 08/18/2017 03:07 PM    BUN 17 08/18/2017 03:07 PM    CREATININE 0.87 08/18/2017 03:07 PM        No results found for: PROTHROMBTM, INR           Assessment:  GCS 15, no focal deficits, tenderness posterior head as expected, abrasion noted, nothing \"closable\", minimal nausea abated with Zofran.     Additional Plans:  TEG pending  Neuro checks q 2 hours x 3        "

## 2021-12-22 NOTE — ASSESSMENT & PLAN NOTE
Nondisplaced left occipital skull fracture.  Isolated closed head injury.  BIG 2.Trauma Brain Injury Guidelines implemented.   TEG AA 12.1% ADP 23.5%  Neurosurgery consultation not indicated.

## 2021-12-22 NOTE — PROGRESS NOTES
4 Eyes Skin Assessment Completed by СЕРГЕЙ Ang and СЕРГЕЙ Merino.    Head Swelling and Redness laceration to left back head  Ears WDL  Nose WDL  Mouth WDL  Neck WDL  Breast/Chest WDL  Shoulder Blades WDL  Spine WDL  (R) Arm/Elbow/Hand WDL  (L) Arm/Elbow/Hand WDL  Abdomen Incision  Groin WDL  Scrotum/Coccyx/Buttocks WDL  (R) Leg WDL  (L) Leg WDL  (R) Heel/Foot/Toe WDL  (L) Heel/Foot/Toe Jaundice          Devices In Places ECG, Blood Pressure Cuff, Pulse Ox and SCD's      Interventions In Place Pillows, Q2 Turns and Low Air Loss Mattress    Possible Skin Injury No    Pictures Uploaded Into Epic N/A  Wound Consult Placed N/A  RN Wound Prevention Protocol Ordered No      Patient belongings  Socks  Jeans  Boots  Long underwear  Bra  Underwear  turtle neck   White fleece   black jacket.    Son has purse

## 2021-12-22 NOTE — ED TRIAGE NOTES
Pt BIB EMS from home.  Pt was out walking her dog and slipped on ice and fell backwards striking the back of his head, unk LOC.  Pt c/o head neck pain.  Pt reported blood thinner to EMS, TBI called.  Pt to CT on arrival to ER.  GCS 15.  c-collar placed on arrival to ER d/t neck pain.  zofran given by EMS.

## 2021-12-23 ENCOUNTER — PHARMACY VISIT (OUTPATIENT)
Dept: PHARMACY | Facility: MEDICAL CENTER | Age: 61
End: 2021-12-23
Payer: COMMERCIAL

## 2021-12-23 VITALS
HEART RATE: 58 BPM | WEIGHT: 139.99 LBS | TEMPERATURE: 97.9 F | SYSTOLIC BLOOD PRESSURE: 108 MMHG | HEIGHT: 64 IN | OXYGEN SATURATION: 93 % | BODY MASS INDEX: 23.9 KG/M2 | DIASTOLIC BLOOD PRESSURE: 65 MMHG | RESPIRATION RATE: 16 BRPM

## 2021-12-23 PROBLEM — Z11.52 ENCOUNTER FOR SCREENING FOR COVID-19: Status: RESOLVED | Noted: 2021-12-22 | Resolved: 2021-12-23

## 2021-12-23 LAB
ALBUMIN SERPL BCP-MCNC: 4.1 G/DL (ref 3.2–4.9)
ALBUMIN/GLOB SERPL: 1.7 G/DL
ALP SERPL-CCNC: 45 U/L (ref 30–99)
ALT SERPL-CCNC: 7 U/L (ref 2–50)
ANION GAP SERPL CALC-SCNC: 10 MMOL/L (ref 7–16)
AST SERPL-CCNC: 13 U/L (ref 12–45)
BASOPHILS # BLD AUTO: 0.2 % (ref 0–1.8)
BASOPHILS # BLD: 0.02 K/UL (ref 0–0.12)
BILIRUB SERPL-MCNC: 1 MG/DL (ref 0.1–1.5)
BUN SERPL-MCNC: 12 MG/DL (ref 8–22)
CALCIUM SERPL-MCNC: 8.4 MG/DL (ref 8.5–10.5)
CHLORIDE SERPL-SCNC: 107 MMOL/L (ref 96–112)
CO2 SERPL-SCNC: 23 MMOL/L (ref 20–33)
CREAT SERPL-MCNC: 0.71 MG/DL (ref 0.5–1.4)
EOSINOPHIL # BLD AUTO: 0.04 K/UL (ref 0–0.51)
EOSINOPHIL NFR BLD: 0.4 % (ref 0–6.9)
ERYTHROCYTE [DISTWIDTH] IN BLOOD BY AUTOMATED COUNT: 42.5 FL (ref 35.9–50)
GLOBULIN SER CALC-MCNC: 2.4 G/DL (ref 1.9–3.5)
GLUCOSE SERPL-MCNC: 101 MG/DL (ref 65–99)
HCT VFR BLD AUTO: 35.4 % (ref 37–47)
HGB BLD-MCNC: 11.5 G/DL (ref 12–16)
IMM GRANULOCYTES # BLD AUTO: 0.03 K/UL (ref 0–0.11)
IMM GRANULOCYTES NFR BLD AUTO: 0.3 % (ref 0–0.9)
LYMPHOCYTES # BLD AUTO: 2.62 K/UL (ref 1–4.8)
LYMPHOCYTES NFR BLD: 28.2 % (ref 22–41)
MAGNESIUM SERPL-MCNC: 2 MG/DL (ref 1.5–2.5)
MCH RBC QN AUTO: 29.5 PG (ref 27–33)
MCHC RBC AUTO-ENTMCNC: 32.5 G/DL (ref 33.6–35)
MCV RBC AUTO: 90.8 FL (ref 81.4–97.8)
MONOCYTES # BLD AUTO: 0.63 K/UL (ref 0–0.85)
MONOCYTES NFR BLD AUTO: 6.8 % (ref 0–13.4)
NEUTROPHILS # BLD AUTO: 5.95 K/UL (ref 2–7.15)
NEUTROPHILS NFR BLD: 64.1 % (ref 44–72)
NRBC # BLD AUTO: 0 K/UL
NRBC BLD-RTO: 0 /100 WBC
PHOSPHATE SERPL-MCNC: 3 MG/DL (ref 2.5–4.5)
PLATELET # BLD AUTO: 304 K/UL (ref 164–446)
PMV BLD AUTO: 9 FL (ref 9–12.9)
POTASSIUM SERPL-SCNC: 3.6 MMOL/L (ref 3.6–5.5)
PROT SERPL-MCNC: 6.5 G/DL (ref 6–8.2)
RBC # BLD AUTO: 3.9 M/UL (ref 4.2–5.4)
SARS-COV+SARS-COV-2 AG RESP QL IA.RAPID: NORMAL
SARS-COV+SARS-COV-2 AG RESP QL IA.RAPID: NOTDETECTED
SODIUM SERPL-SCNC: 140 MMOL/L (ref 135–145)
SPECIMEN SOURCE: NORMAL
SPECIMEN SOURCE: NORMAL
WBC # BLD AUTO: 9.3 K/UL (ref 4.8–10.8)

## 2021-12-23 PROCEDURE — 85025 COMPLETE CBC W/AUTO DIFF WBC: CPT

## 2021-12-23 PROCEDURE — 99232 SBSQ HOSP IP/OBS MODERATE 35: CPT | Performed by: SURGERY

## 2021-12-23 PROCEDURE — 94669 MECHANICAL CHEST WALL OSCILL: CPT

## 2021-12-23 PROCEDURE — A9270 NON-COVERED ITEM OR SERVICE: HCPCS | Performed by: SPECIALIST

## 2021-12-23 PROCEDURE — 700111 HCHG RX REV CODE 636 W/ 250 OVERRIDE (IP): Performed by: SURGERY

## 2021-12-23 PROCEDURE — 80053 COMPREHEN METABOLIC PANEL: CPT

## 2021-12-23 PROCEDURE — 92523 SPEECH SOUND LANG COMPREHEN: CPT

## 2021-12-23 PROCEDURE — A9270 NON-COVERED ITEM OR SERVICE: HCPCS | Performed by: NURSE PRACTITIONER

## 2021-12-23 PROCEDURE — 700102 HCHG RX REV CODE 250 W/ 637 OVERRIDE(OP): Performed by: NURSE PRACTITIONER

## 2021-12-23 PROCEDURE — 87426 SARSCOV CORONAVIRUS AG IA: CPT

## 2021-12-23 PROCEDURE — 700102 HCHG RX REV CODE 250 W/ 637 OVERRIDE(OP): Performed by: SPECIALIST

## 2021-12-23 PROCEDURE — 84100 ASSAY OF PHOSPHORUS: CPT

## 2021-12-23 PROCEDURE — RXMED WILLOW AMBULATORY MEDICATION CHARGE: Performed by: NURSE PRACTITIONER

## 2021-12-23 PROCEDURE — 700105 HCHG RX REV CODE 258: Performed by: SURGERY

## 2021-12-23 PROCEDURE — 700102 HCHG RX REV CODE 250 W/ 637 OVERRIDE(OP): Performed by: SURGERY

## 2021-12-23 PROCEDURE — 83735 ASSAY OF MAGNESIUM: CPT

## 2021-12-23 PROCEDURE — A9270 NON-COVERED ITEM OR SERVICE: HCPCS | Performed by: SURGERY

## 2021-12-23 RX ORDER — OXYCODONE HYDROCHLORIDE 5 MG/1
5 TABLET ORAL
Qty: 10 TABLET | Refills: 0 | Status: SHIPPED | OUTPATIENT
Start: 2021-12-23 | End: 2021-12-30

## 2021-12-23 RX ORDER — SCOLOPAMINE TRANSDERMAL SYSTEM 1 MG/1
1 PATCH, EXTENDED RELEASE TRANSDERMAL
Status: DISCONTINUED | OUTPATIENT
Start: 2021-12-23 | End: 2021-12-23 | Stop reason: HOSPADM

## 2021-12-23 RX ORDER — BUTALBITAL, ACETAMINOPHEN AND CAFFEINE 50; 325; 40 MG/1; MG/1; MG/1
1-2 TABLET ORAL EVERY 6 HOURS PRN
Status: DISCONTINUED | OUTPATIENT
Start: 2021-12-23 | End: 2021-12-23 | Stop reason: HOSPADM

## 2021-12-23 RX ORDER — BUTALBITAL, ACETAMINOPHEN AND CAFFEINE 50; 325; 40 MG/1; MG/1; MG/1
1-2 TABLET ORAL EVERY 6 HOURS PRN
Qty: 56 TABLET | Refills: 0 | Status: SHIPPED | OUTPATIENT
Start: 2021-12-23 | End: 2022-01-01

## 2021-12-23 RX ORDER — OXYCODONE HYDROCHLORIDE 10 MG/1
10 TABLET ORAL
Status: DISCONTINUED | OUTPATIENT
Start: 2021-12-23 | End: 2021-12-23

## 2021-12-23 RX ORDER — SCOLOPAMINE TRANSDERMAL SYSTEM 1 MG/1
1 PATCH, EXTENDED RELEASE TRANSDERMAL
Qty: 4 PATCH | Refills: 0 | Status: SHIPPED | OUTPATIENT
Start: 2021-12-26

## 2021-12-23 RX ORDER — OXYCODONE HYDROCHLORIDE 5 MG/1
5 TABLET ORAL
Status: DISCONTINUED | OUTPATIENT
Start: 2021-12-23 | End: 2021-12-23 | Stop reason: HOSPADM

## 2021-12-23 RX ADMIN — POLYETHYLENE GLYCOL 3350 1 PACKET: 17 POWDER, FOR SOLUTION ORAL at 05:14

## 2021-12-23 RX ADMIN — FAMOTIDINE 20 MG: 10 INJECTION INTRAVENOUS at 05:14

## 2021-12-23 RX ADMIN — BUTALBITAL, ACETAMINOPHEN, AND CAFFEINE 2 TABLET: 50; 325; 40 TABLET ORAL at 09:12

## 2021-12-23 RX ADMIN — OXYCODONE 2.5 MG: 5 TABLET ORAL at 02:50

## 2021-12-23 RX ADMIN — ONDANSETRON 4 MG: 2 INJECTION INTRAMUSCULAR; INTRAVENOUS at 05:16

## 2021-12-23 RX ADMIN — SCOPALAMINE 1 PATCH: 1 PATCH, EXTENDED RELEASE TRANSDERMAL at 08:49

## 2021-12-23 RX ADMIN — ACETAMINOPHEN 650 MG: 325 TABLET, FILM COATED ORAL at 05:13

## 2021-12-23 RX ADMIN — DOCUSATE SODIUM 100 MG: 100 CAPSULE ORAL at 05:14

## 2021-12-23 RX ADMIN — SODIUM CHLORIDE: 9 INJECTION, SOLUTION INTRAVENOUS at 00:00

## 2021-12-23 RX ADMIN — LEVOTHYROXINE SODIUM 25 MCG: 0.03 TABLET ORAL at 05:13

## 2021-12-23 ASSESSMENT — ENCOUNTER SYMPTOMS
NAUSEA: 0
MYALGIAS: 0
DIZZINESS: 1
HEADACHES: 1
WEAKNESS: 0
NECK PAIN: 1
SHORTNESS OF BREATH: 0
VOMITING: 0
SPEECH CHANGE: 0
SENSORY CHANGE: 0
TINGLING: 0
BLURRED VISION: 0
COUGH: 0
DOUBLE VISION: 0
SEIZURES: 0
FEVER: 0

## 2021-12-23 ASSESSMENT — COGNITIVE AND FUNCTIONAL STATUS - GENERAL
SUGGESTED CMS G CODE MODIFIER MOBILITY: CH
MOBILITY SCORE: 24
SUGGESTED CMS G CODE MODIFIER DAILY ACTIVITY: CH
DAILY ACTIVITIY SCORE: 24

## 2021-12-23 ASSESSMENT — FIBROSIS 4 INDEX: FIB4 SCORE: 0.95

## 2021-12-23 ASSESSMENT — LIFESTYLE VARIABLES: SUBSTANCE_ABUSE: 0

## 2021-12-23 ASSESSMENT — PAIN DESCRIPTION - PAIN TYPE
TYPE: ACUTE PAIN
TYPE: ACUTE PAIN

## 2021-12-23 NOTE — THERAPY
"Speech Language Pathology   Initial Assessment     Patient Name: Coleen Clinton  AGE:  61 y.o., SEX:  female  Medical Record #: [id number]  Today's Date: 12/23/2021          Assessment    Patient is 61 y.o. female slipped and fell on the ice, striking the back of her head while she was walking her dog.  She had a bump as well as laceration with positive loss of consciousness.    CT of head:  1.  Small left parietal scalp contusion.  2.  Nondisplaced left occipital skull fracture.  3.  No acute intracranial hemorrhage.    Patient was seen on this date for a cognitive-linguistic evaluation. Patient's son at bedside. Pt reported working full time from home and was independent with IADLs prior to this hospitalization. Patient kept eyes closed for portion of evaluation and reported d/t pressure behind eyes and photosensitivity. Pt reported feeling 100% in regards to cognition stating, \"I can think clearly,\" with the exception of feeling tired and intermittent headache.     Portions of the COGNISTAT (Neurobehavioral Cognitive Status Assess) and other measures were administered.     Patient scored the following on the Cognistat:  Orientation: WNL  Attention: WNL  Comprehension (Language): WNL  Repetition (Language): WNL  Naming (Language): WNL  Memory: WNL  Calculations: WNL  Similarities (Reasoning): WNL   Judgment (Reasoning): WNL     Further testing revealed performance on the following domains was also WNL: clock drawing, writing, written arithmetic, thought organization, following written instructions at the sentence level, and medication management. Education provided to pt and son regarding current status and SLP recs.    Recommendation  Patient appears at or close to baseline mentation. No further acute SLP services indicated. However, may benefit from outpatient SLP services if pt feels she is not at her PLOF once d/c home.     Plan    Recommend Speech Therapy for Evaluation only for the following treatments:  " Cognitive-Linguistic Training and Patient / Family / Caregiver Education.    Discharge Recommendations: Recommend outpatient speech therapy services     Objective       12/23/21 1046   Vitals   O2 (LPM) 2   O2 Delivery Device Silicone Nasal Cannula   Prior Level Of Function   Communication Within Functional Limits   Dentures None   Hearing Within Functional Limits for Evaluation   Hearing Aid None   Patient's Primary Language English   Education Completed College   Education Years Completed 18   Occupation (Pre-Hospital Vocational) Employed Full Time   Verbal Expression   Verbal Expression / Aphasia Eval (WDL) WDL   Auditory Comprehension   Auditory Comprehension (WDL) WDL   Reading Comprehension   Reading Comprehension (WDL) WDL   Written Expression   Written Expression (WDL) WDL   Cognitive-Linguistic   Cognitive-Linguistic (WDL) WDL

## 2021-12-23 NOTE — H&P
DATE OF ADMISSION:  12/22/2021     IDENTIFICATION:  A 61-year-old female.     HISTORY OF PRESENT ILLNESS:  The patient was in her usual state of health   until today when she slipped and fell on the ice, striking the back of her   head while she was walking her dog.  She had a bump as well as laceration with   positive loss of consciousness.  She was taken home by a passer by and her   son called EMS and the patient was transferred to [facility] as a   .  Her workup found her to have an occipital skull fracture.  I have been   asked to see her in regards to this.     PAST MEDICAL HISTORY:  ILLNESSES:  Dyslipidemia.     PAST SURGICAL HISTORY:  Knee arthroplasty performed in 2017.  She has had ACL   repair as well as gynecological surgery.     MEDICATIONS:  Currently are Levoxyl.     ALLERGIES:  None.     SOCIAL HISTORY:  She does not smoke.  She drinks occasionally.     REVIEW OF SYSTEMS:  Otherwise unremarkable.     PHYSICAL EXAMINATION:  VITAL SIGNS:  Her blood pressure was in the 120s, heart rate was 60s.  GENERAL:  She is alert and cooperative.  HEENT:  Head is without evidence of obvious trauma.  Her pupils are 3 mm.    Extraocular movements intact.  NECK:  Not in a C-collar.  She has some tenderness in the paraspinous muscles,   but nothing central.  CHEST:  Nontender.  ABDOMEN:  Soft.  PELVIS:  Stable.  EXTREMITIES:  Without evidence of injury.  NEUROLOGIC:  GCS of 15.     DIAGNOSTIC DATA:  CT scan of the head demonstrated a left occipital skull   fracture.  CT of the neck was negative.     IMPRESSION:  A 61-year-old female, status post a ground level fall with skull   fracture.     PLAN:  Based on her nondisplaced skull fractures consistent with BIG 2   criteria, she will be admitted to the ICU for serial q.2 hour neurologic   checks and monitoring for any changes.  She does not require any further   imaging or antiseizure medications.  We will monitor her and if she clears, we   will advance  her diet and work on getting her discharged.        ______________________________  MD EMILIANA AQUINO/PRASANTH/ISABELLE    DD:  12/22/2021 17:05  DT:  12/22/2021 17:34    Job#:  [id number]

## 2021-12-23 NOTE — ASSESSMENT & PLAN NOTE
Prophylactic anticoagulation for thrombotic prevention initially contraindicated secondary to elevated bleeding risk.

## 2021-12-23 NOTE — DISCHARGE PLANNING
Anticipated Discharge Disposition: TBD - most likely home based on 6 clicks     Action: Chart review and assessment completed. Patient was admitted yesterday after a slip & fall on ice resulting in a skull fracture. She was admitted to ICU for q.2 hour neuro checks. Documentation shows patient lives alone in a single level home. Independent with ADL's/IADL's prior to admission with no DME need. Patient's preferred pharmacy is [facility] & she has coverage through her [employer] health plan. She does have a PCP. She is currently on 2L O2 with plan to wean. Based on 6 click score (24 & 24) anticipate dc home.     Barriers to Discharge: Medical clearance     Plan: Assist if dc needs arise     Care Transition Team Assessment    Information Source  Orientation Level: Oriented X4  Information Given By: Patient,Other (Comments)  Informant's Name: EMR  Who is responsible for making decisions for patient? : Patient    Readmission Evaluation  Is this a readmission?: No    Elopement Risk  Legal Hold: No  Ambulatory or Self Mobile in Wheelchair: No-Not an Elopement Risk  Elopement Risk: Not at Risk for Elopement    Interdisciplinary Discharge Planning  Primary Care Physician: yes  Lives with - Patient's Self Care Capacity: Alone and Able to Care For Self  Patient or legal guardian wants to designate a caregiver: No  Support Systems: Children,Friends / Neighbors,Family Member(s)  Housing / Facility: 1 Story House  Durable Medical Equipment: Not Applicable    Discharge Preparedness  What is your plan after discharge?: Uncertain - pending medical team collaboration  What are your discharge supports?: Child  Prior Functional Level: Ambulatory,Drives Self,Independent with Activities of Daily Living,Independent with Medication Management    Functional Assesment  Prior Functional Level: Ambulatory,Drives Self,Independent with Activities of Daily Living,Independent with Medication Management    Finances  Financial Barriers to Discharge:  No  Prescription Coverage: Yes    Vision / Hearing Impairment  Vision Impairment : No  Hearing Impairment : No    Domestic Abuse  Have you ever been the victim of abuse or violence?: No  Physical Abuse or Sexual Abuse: No  Verbal Abuse or Emotional Abuse: No  Possible Abuse/Neglect Reported to:: Not Applicable    Psychological Assessment  History of Substance Abuse: None  History of Psychiatric Problems: No  Non-compliant with Treatment: No  Newly Diagnosed Illness: Yes    Discharge Risks or Barriers  Discharge risks or barriers?: Post-acute placement / services,Complex medical needs    Anticipated Discharge Information  Discharge Disposition: Discharged to home/self care

## 2021-12-23 NOTE — THERAPY
PT consult received. Pt observed ambulating in room and dressing her self in prepare for DC. RN reports pt ambulated w/o assist or AD approx 200+ ft to bathroom. Acute PT evaluation not warranted at this time.     Mary Pradhan, PT, DPT [employer] [id number]

## 2021-12-23 NOTE — CARE PLAN
The patient is Watcher - Medium risk of patient condition declining or worsening    Shift Goals  Clinical Goals: neuro stablity  Patient Goals: pain control  Family Goals: comfort, updates    Progress made toward(s) clinical / shift goals:  Q2hour neuro assessments    Problem: Pain - Standard  Goal: Alleviation of pain or a reduction in pain to the patient’s comfort goal  Note: Pain assessed utilizing 0-10 Pain assessment tool. Pain medications given per MAR      Problem: Fall Risk  Goal: Patient will remain free from falls  Note: Bed in lowest/locked position, call light within reach, bedside table within reach. Patient educated to call for assistance prior to mobilization. Patient verbalizes understanding of RN instruction. Patient demonstrates correct use of call light.          Patient is not progressing towards the following goals:

## 2021-12-23 NOTE — PROGRESS NOTES
Trauma Discharge Summary    DATE OF ADMISSION: 12/22/2021    DATE OF DISCHARGE: 12/23/2021    LENGTH OF STAY: 1 day    ATTENDING PHYSICIAN: Sonia Panda M.D. trauma surgery    CONSULTING PHYSICIAN:   None    DISCHARGE DIAGNOSIS:  Principal Problem:    Closed fracture of occipital bone (HCC) POA: Yes  Active Problems:    Contraindication to deep vein thrombosis (DVT) prophylaxis POA: Yes    Trauma POA: Yes    Hypothyroid POA: Yes    Dyslipidemia POA: Yes  Resolved Problems:    Encounter for screening for COVID-19 POA: Yes      PROCEDURES:  None    HISTORY OF PRESENT ILLNESS: The patient is a 61 y.o. female who was reportedly injured in a ground-level fall. She was transferred to [facility] in [place], [place].    HOSPITAL COURSE: The patient was triaged as a consult activation. The patient was transported to trauma intensive care unit. The patient was noted to have a nondisplaced left occipital skull fracture with no intracranial hemorrhage noted. She was determined to meet trauma brain injury guidelines as a BIG 2. She was evaluated with a TEG with platelet mapping which noted insignificant AA and ADP inhibition. Neurosurgery consult was not indicated. The patient remained neurologically stable and no further imaging was indicated.    The patient did have history of hypothyroidism and dyslipidemia, with home medications resumed on admission.    The patient was evaluated by physical therapy and speech therapy and felt that the patient was safe for discharge home.  On day of discharge the patient is up ambulating independently.  She is reporting adequate pain control with headache significantly improved with Fioricet.  Her vital signs remained stable with oxygen saturations greater than 92% on room air and supplemental oxygen at night at her baseline.  She is tolerating a diet, voiding, and having bowel movements as expected.  Her GCS remains 15 and neurologically intact.    HOSPITAL PROBLEM  LIST:  * Closed fracture of occipital bone (HCC)- (present on admission)  Assessment & Plan  Nondisplaced left occipital skull fracture.  Isolated closed head injury.  BIG 2.Trauma Brain Injury Guidelines implemented.   TEG AA 12.1% ADP 23.5%  Neurosurgery consultation not indicated.    Contraindication to deep vein thrombosis (DVT) prophylaxis- (present on admission)  Assessment & Plan  Prophylactic anticoagulation for thrombotic prevention initially contraindicated secondary to elevated bleeding risk.    Encounter for screening for COVID-19-resolved as of 12/23/2021, (present on admission)  Assessment & Plan  Admission SARS-CoV-2 testing negative. Repeat SARS-CoV-2 testing not indicated. Isolation precautions de-escalated.    Dyslipidemia- (present on admission)  Assessment & Plan  Chronic condition treated with atorvastatin.  Resumed maintenance medication on admission.    Hypothyroid- (present on admission)  Assessment & Plan  Chronic condition treated with Levoxyl.  Resumed maintenance medication on admission.    Trauma- (present on admission)  Assessment & Plan  Slip and fall on ice.  Non Trauma Activation.  Sonia Panda MD. Trauma Surgery.      DISCHARGE PHYSICAL EXAM: See [facility] physical exam dated 12/23/2021    DISPOSITION: Discharged home with family on 12/23/2021. The patient was counseled and questions were answered. Specifically, signs and symptoms of infection, respiratory decompensation and persistent or worsening pain were discussed and the patient agrees to seek medical attention if any of these develop.    DISCHARGE MEDICATIONS:  The patients controlled substance history was reviewed and a controlled substance use informed consent (if applicable) was provided by [facility] and the patient has been prescribed.     Medication List      START taking these medications      Instructions   butalbital/apap/caffeine -40 mg -40 MG Tabs  Commonly known as: Fioricet   Take  1-2 Tablets by mouth every 6 hours as needed for Headache for up to 7 days.  Dose: 1-2 Tablet     oxyCODONE immediate-release 5 MG Tabs  Commonly known as: ROXICODONE   Take 1 Tablet by mouth every 3 hours as needed for up to 7 days.  Dose: 5 mg     scopolamine 1 mg/72hr Pt72  Start taking on: December 26, 2021  Commonly known as: TRANSDERM-SCOP   Place 1 Patch on the skin every 72 hours.  Dose: 1 Patch        CONTINUE taking these medications      Instructions   atorvastatin 10 MG Tabs  Commonly known as: LIPITOR   Take 10 mg by mouth every evening.  Dose: 10 mg     Levoxyl 25 MCG Tabs  Generic drug: levothyroxine   Take 25 mcg by mouth every morning.  Dose: 25 mcg     VITAMIN C PO   Take 1 Tablet by mouth every morning.  Dose: 1 Tablet     VITAMIN D PO   Take 1 Tablet by mouth every morning.  Dose: 1 Tablet        STOP taking these medications    ibuprofen 200 MG Tabs  Commonly known as: MOTRIN            ACTIVITY:  Normal activities of daily living.  No skiing for the remainder of the season or until cleared by Dr. Panda.    WOUND CARE:  Local wound care    DIET:  Orders Placed This Encounter   Procedures   • Diet Order Diet: Regular     Standing Status:   Standing     Number of Occurrences:   1     Order Specific Question:   Diet:     Answer:   Regular [1]   • Discontinue Diet Tray     Standing Status:   Standing     Number of Occurrences:   1       FOLLOW UP:  Sonia Panda M.D.  [address]  [facility] [id number] [phone]    Schedule an appointment as soon as possible for a visit in 1 week      Nick Ngo D.O.  [address]  [facility] [id number] [phone]    In 1 week        TIME SPENT ON DISCHARGE: 32 minutes      ____________________________________________  EBER Miranda    DD: 12/23/2021 11:08 AM

## 2021-12-23 NOTE — PROGRESS NOTES
"       Briefly, this is a 61 y.o. female with a closed occipital bone fracture due to a slip and fall on ice. She has history of dyslipidemia and hypothyroidism.    /62   Pulse (!) 55   Temp 37.2 °C (98.9 °F) (Temporal)   Resp (!) 11   Ht 1.626 m (5' 4\")   Wt 60.9 kg (134 lb 4.2 oz)   LMP 05/11/2014   SpO2 99%   BMI 23.05 kg/m²       Intake/Output Summary (Last 24 hours) at 12/23/2021 0558  Last data filed at 12/23/2021 0400  Gross per 24 hour   Intake 2281.67 ml   Output 425 ml   Net 1856.67 ml       Lab Results   Component Value Date/Time    WBC 12.0 (H) 12/22/2021 08:00 PM    RBC 3.92 (L) 12/22/2021 08:00 PM    HEMOGLOBIN 12.1 12/22/2021 08:00 PM    HEMATOCRIT 35.7 (L) 12/22/2021 08:00 PM    MCV 91.1 12/22/2021 08:00 PM    MCH 30.9 12/22/2021 08:00 PM    MCHC 33.9 12/22/2021 08:00 PM    MPV 8.8 (L) 12/22/2021 08:00 PM    NEUTSPOLYS 85.30 (H) 12/22/2021 08:00 PM    LYMPHOCYTES 11.80 (L) 12/22/2021 08:00 PM    MONOCYTES 2.50 12/22/2021 08:00 PM    EOSINOPHILS 0.00 12/22/2021 08:00 PM    BASOPHILS 0.10 12/22/2021 08:00 PM        Lab Results   Component Value Date/Time    SODIUM 137 12/22/2021 08:00 PM    POTASSIUM 3.9 12/22/2021 08:00 PM    CHLORIDE 104 12/22/2021 08:00 PM    CO2 21 12/22/2021 08:00 PM    GLUCOSE 108 (H) 12/22/2021 08:00 PM    BUN 13 12/22/2021 08:00 PM    CREATININE 0.74 12/22/2021 08:00 PM        No results found for: PROTHROMBTM, INR     Recent Labs     12/22/21  1420   REACTMIN 3.2*   CLOTKINET 0.9   CLOTANGL 77.7   MAXCLOTS 64.2   SKY88URG 0.2   PRCINADP 23.5*   PRCINAA 12.1*       Assessment:  GCS 15  Urinated in bathroom  No acute events overnight  Tolerating Oxycodone and would like stronger dose  Tylenol helped more than Oxycodone  Pain is now more generalized from head to neck   Tolerated clear diet    Additional Plans:  Advance diet as tolerable  Pain medication adjusted   Tertiary survey  Await repeat covid results  "

## 2021-12-23 NOTE — DISCHARGE INSTRUCTIONS
Discharge Instructions    Discharged to home by car with relative. Discharged via walking, hospital escort: Yes.  Special equipment needed: Not Applicable    Be sure to schedule a follow-up appointment with your primary care doctor or any specialists as instructed.     Discharge Plan:   Diet Plan: Discussed  Activity Level: Discussed  Confirmed Follow up Appointment: Patient to Call and Schedule Appointment  Confirmed Symptoms Management: Discussed  Medication Reconciliation Updated: Yes  Influenza Vaccine Indication: Patient Refuses    I understand that a diet low in cholesterol, fat, and sodium is recommended for good health. Unless I have been given specific instructions below for another diet, I accept this instruction as my diet prescription.   Other diet: regular healthy diet    Special Instructions:    - Call or seek medical attention for questions or concerns  - Follow up with Dr. Panda or primary care provider in 1 weeks time  - Avoid all blood thinners including aspirin or NSAIDs (ibuprophen, Advil, Aleve, Motrin) for at least two weeks  - Resume regular diet  - May take over the counter acetaminophen (Fioricet has acetaminophen in it, do not consume more than 4g in 24 hours) as needed for pain  - No skiing for the remainder of the season or until cleared by Dr. Panda/PCP   - Continue daily over the counter stool softener while on narcotics  - No operation of machinery or motorized vehicles while under the influence of narcotics  - No alcohol, marijuana or illicit drug use while under the influence of narcotics  - In the event of a narcotic overdose naloxone (Narcan) is available without a prescription from any [facility] or [facility]  - No swimming, hot tubs, baths or wound submersion until cleared by outpatient provider. May shower  - No contact sports, strenuous activities, or heavy lifting until cleared by outpatient provider  - If respiratory decompensation, persistent or worsening pain, or signs  or symptoms of infection occur seek medical attention      Skull Fracture, Adult    A skull fracture is a break or crack in one of the bones that make up the skull. Skull fractures range in severity. A skull fracture is more severe if the bones move out of place after the fracture, or if the brain, spine, nerves, or nearby blood vessels are also injured. A skull fracture is an emergency and needs immediate medical attention.  What are the causes?  This condition is usually caused by a forceful injury to the head, such as from:  · A fall.  · An assault.  · A hard, direct hit to the head.  · A car crash or a recreational vehicle crash.  What are the signs or symptoms?  Symptoms of a skull fracture depend on what type of injury caused the fracture and how severe the injury is. Symptoms may include:  · A headache.  · Pain, swelling, or an indent in one area of the head or scalp.  · Clear or bloody liquid leaking from the nose or ears.  · Blurred or double vision.  · Slurred speech.  · Nausea or vomiting.  · Bruising around the eyes or behind the ears.  · Weakness or numbness in the face or in one side or area of the body.  · A sudden loss of hearing or smell.  · Confusion.  · Trouble with balance or coordination.  · Seizures.  How is this diagnosed?  This condition is diagnosed based on:  · A physical exam and your medical history.  · Tests, such as:  ? CT scan.  ? X-rays.  ? MRI.  ? A hearing test and an eye exam.  ? A nerve test. This may be done to check for any damage to your facial nerves.  If clear or bloody liquid is leaking from your nose or ears, it may be tested to check if it is cerebrospinal fluid, which is the type of fluid that surrounds the brain and spinal cord.  How is this treated?  Most skull fractures heal without treatment. If treatment is needed, it may include:  · Observation and rest. You may be admitted to a hospital for close observation.  · Medicines. These may be given to relieve symptoms  such as headaches, seizures, and nausea.  · Antibiotic medicines, if you have a scalp wound.  If you have a severe fracture, you may need surgery to correct the position of any bones that have moved. Surgery may also be needed to treat injuries to other areas of the head, spine, and face.  Follow these instructions at home:  Medicines  · Take over-the-counter and prescription medicines only as told by your health care provider.  · If you were prescribed an antibiotic medicine, take it as told by your health care provider. Do not stop taking the antibiotic even if you start to feel better.  Wound care    · Follow instructions from your health care provider about how to take care of your wound. Make sure you:  ? Wash your hands with soap and water before and after you change your bandage (dressing). If soap and water are not available, use hand .  ? Change your dressing as told by your health care provider.  ? If you have stitches (sutures), staples, skin glue, or adhesive strips, leave them in place. These skin closures may need to stay in place for 2 weeks or longer. If adhesive strip edges start to loosen and curl up, you may trim the loose edges. Do not remove adhesive strips completely unless your health care provider tells you to do that.  ? Check your wound every day for signs of infection. Check for:  § More redness, swelling, or pain.  § More fluid or blood.  § Warmth.  § Pus or a bad smell.  Activity  · Rest as told by your health care provider. Ask your health care provider when you can return to your normal activities.  · Do not lift anything that is heavier than 10 lb (4.5 kg), or the limit that you are told, until your health care provider says that it is safe.  · Do not drive or use heavy machinery until your health care provider says that it is safe.  General instructions  · If you were treated in the hospital, have someone stay with you when you go home. This person will need to watch you for  a few days and make sure that you get medical care if you have problems. Ask your health care provider how long someone should stay with you.  · Do not drink alcohol until your health care provider says that you can.  · Do not blow your nose until your health care provider says that it is okay.  · Raise (elevate) your head above the level of your heart while you are lying down.  · Keep all follow-up visits as told by your health care provider. This is important.  Contact a health care provider if:  · You feel nauseous.  · You have ongoing (persistent) headaches that are not relieved by medicines.  · Your symptoms do not go away.  · Your wound appears infected or starts bleeding.  Get help right away if:  · You vomit more than once.  · You feel confused.  · You have trouble talking or walking.  · You have seizures.  · You are drowsy or have trouble waking up.  · You lose consciousness.  · Your eyes move back and forth rapidly.  · You have a severe headache.  · Your arms or legs do not move the way they should.  · Your pupils change size much more than they normally do.  · You have clear or bloody liquid coming from your nose or ears.  These symptoms may represent a serious problem that is an emergency. Do not wait to see if the symptoms will go away. Get medical help right away. Call your local emergency services (911 in the [place] Do not drive yourself to the hospital.  Summary  · A skull fracture is a break or crack in one of the bones that make up the skull. This condition is an emergency and needs immediate medical attention.  · A skull fracture is usually caused by a forceful injury to the head.  · Most skull fractures heal without treatment. If treatment is needed, it may include rest, medicines, and surgery.  · Keep all follow-up visits as told by your health care provider. This is important.  This information is not intended to replace advice given to you by your health care provider. Make sure you discuss  any questions you have with your health care provider.  Document Released: 10/18/2005 Document Revised: 03/05/2020 Document Reviewed: 03/05/2020  Elsevier Patient Education © 2020 [employer]  Depression / Suicide Risk    As you are discharged from this [facility] facility, it is important to learn how to keep safe from harming yourself.    Recognize the warning signs:  · Abrupt changes in personality, positive or negative- including increase in energy   · Giving away possessions  · Change in eating patterns- significant weight changes-  positive or negative  · Change in sleeping patterns- unable to sleep or sleeping all the time   · Unwillingness or inability to communicate  · Depression  · Unusual sadness, discouragement and loneliness  · Talk of wanting to die  · Neglect of personal appearance   · Rebelliousness- reckless behavior  · Withdrawal from people/activities they love  · Confusion- inability to concentrate     If you or a loved one observes any of these behaviors or has concerns about self-harm, here's what you can do:  · Talk about it- your feelings and reasons for harming yourself  · Remove any means that you might use to hurt yourself (examples: pills, rope, extension cords, firearm)  · Get professional help from the community (Mental Health, Substance Abuse, psychological counseling)  · Do not be alone:Call your Safe Contact- someone whom you trust who will be there for you.  · Call your local CRISIS HOTLINE 241-1824 or 681-274-7327  · Call your local Children's Mobile Crisis Response Team [place] (807) 366-1632 or www.KeyLemon  · Call the toll free National Suicide Prevention Hotlines   · National Suicide Prevention Lifeline 259-228-SLGD (2158)  · National Hope Line Network 800-SUICIDE (863-1603)

## 2021-12-23 NOTE — PROGRESS NOTES
Trauma / Surgical Daily Progress Note    Date of Service  12/23/2021    Chief Complaint  61 y.o. female admitted 12/22/2021 with non-displaced left occipital skull fracture    Interval Events  New admission  GCS 15, headache, mild dizziness and nausea  Tolerating clear liquid diet    - Advance to regular diet, saline lock  - Scopolamine and Fioricet added  - PT and SLP pending  - Transfer to [facility]  - Anticipate DC home this afternoon pending mobilization and therapy recommendations     Review of Systems  Review of Systems   Constitutional: Negative for fever and malaise/fatigue.   HENT: Negative for hearing loss.    Eyes: Negative for blurred vision and double vision.   Respiratory: Negative for cough and shortness of breath.         Baseline sleep apnea   Cardiovascular: Negative for chest pain and leg swelling.   Gastrointestinal: Negative for nausea and vomiting.   Genitourinary: Negative for dysuria, frequency and urgency.        Voiding   Musculoskeletal: Positive for neck pain (Muscle soreness). Negative for myalgias.   Neurological: Positive for dizziness and headaches. Negative for tingling, sensory change, speech change, seizures and weakness.   Psychiatric/Behavioral: Negative for substance abuse.        Vital Signs  Temp:  [36.3 °C (97.4 °F)-37.2 °C (98.9 °F)] 36.5 °C (97.7 °F)  Pulse:  [51-79] 56  Resp:  [10-24] 12  BP: ()/(44-77) 123/64  SpO2:  [93 %-100 %] 99 %    Physical Exam  Physical Exam  Vitals and nursing note reviewed.   Constitutional:       General: She is not in acute distress.     Appearance: She is not ill-appearing.   HENT:      Head: Normocephalic.      Nose: Nose normal.      Mouth/Throat:      Mouth: Mucous membranes are moist.   Eyes:      Pupils: Pupils are equal, round, and reactive to light.   Cardiovascular:      Rate and Rhythm: Normal rate and regular rhythm.      Pulses: Normal pulses.      Heart sounds: Normal heart sounds.   Pulmonary:      Effort: Pulmonary effort  is normal.      Breath sounds: Normal breath sounds.      Comments: Room air  2L NC while sleeping  Abdominal:      General: Abdomen is flat. Bowel sounds are normal. There is no distension.      Palpations: Abdomen is soft.      Tenderness: There is no abdominal tenderness.   Musculoskeletal:         General: No tenderness. Normal range of motion.      Cervical back: Normal range of motion and neck supple. No tenderness.   Skin:     General: Skin is warm and dry.      Capillary Refill: Capillary refill takes 2 to 3 seconds.   Neurological:      General: No focal deficit present.      Mental Status: She is alert and oriented to person, place, and time.   Psychiatric:         Mood and Affect: Mood normal.         Behavior: Behavior normal.         Laboratory  Recent Results (from the past 24 hour(s))   PLATELET MAPPING WITH BASIC TEG    Collection Time: 12/22/21  2:20 PM   Result Value Ref Range    Reaction Time Initial-R 3.2 (L) 4.6 - 9.1 min    React Time Initial Hep 2.7 (L) 4.3 - 8.3 min    Clot Kinetics-K 0.9 0.8 - 2.1 min    Clot Angle-Angle 77.7 63.0 - 78.0 degrees    Maximum Clot Strength-MA 64.2 52.0 - 69.0 mm    TEG Functional Fibrinogen(MA) 23.3 15.0 - 32.0 mm    Lysis 30 minutes-LY30 0.2 0.0 - 2.6 %    % Inhibition ADP 23.5 (H) 0.0 - 17.0 %    % Inhibition AA 12.1 (H) 0.0 - 11.0 %    TEG Algorithm Link Algorithm    CBC WITH DIFFERENTIAL    Collection Time: 12/22/21  8:00 PM   Result Value Ref Range    WBC 12.0 (H) 4.8 - 10.8 K/uL    RBC 3.92 (L) 4.20 - 5.40 M/uL    Hemoglobin 12.1 12.0 - 16.0 g/dL    Hematocrit 35.7 (L) 37.0 - 47.0 %    MCV 91.1 81.4 - 97.8 fL    MCH 30.9 27.0 - 33.0 pg    MCHC 33.9 33.6 - 35.0 g/dL    RDW 42.8 35.9 - 50.0 fL    Platelet Count 325 164 - 446 K/uL    MPV 8.8 (L) 9.0 - 12.9 fL    Neutrophils-Polys 85.30 (H) 44.00 - 72.00 %    Lymphocytes 11.80 (L) 22.00 - 41.00 %    Monocytes 2.50 0.00 - 13.40 %    Eosinophils 0.00 0.00 - 6.90 %    Basophils 0.10 0.00 - 1.80 %    Immature  Granulocytes 0.30 0.00 - 0.90 %    Nucleated RBC 0.00 /100 WBC    Neutrophils (Absolute) 10.20 (H) 2.00 - 7.15 K/uL    Lymphs (Absolute) 1.41 1.00 - 4.80 K/uL    Monos (Absolute) 0.30 0.00 - 0.85 K/uL    Eos (Absolute) 0.00 0.00 - 0.51 K/uL    Baso (Absolute) 0.01 0.00 - 0.12 K/uL    Immature Granulocytes (abs) 0.04 0.00 - 0.11 K/uL    NRBC (Absolute) 0.00 K/uL   Comp Metabolic Panel    Collection Time: 12/22/21  8:00 PM   Result Value Ref Range    Sodium 137 135 - 145 mmol/L    Potassium 3.9 3.6 - 5.5 mmol/L    Chloride 104 96 - 112 mmol/L    Co2 21 20 - 33 mmol/L    Anion Gap 12.0 7.0 - 16.0    Glucose 108 (H) 65 - 99 mg/dL    Bun 13 8 - 22 mg/dL    Creatinine 0.74 0.50 - 1.40 mg/dL    Calcium 8.7 8.5 - 10.5 mg/dL    AST(SGOT) 19 12 - 45 U/L    ALT(SGPT) 16 2 - 50 U/L    Alkaline Phosphatase 48 30 - 99 U/L    Total Bilirubin 0.8 0.1 - 1.5 mg/dL    Albumin 3.9 3.2 - 4.9 g/dL    Total Protein 6.6 6.0 - 8.2 g/dL    Globulin 2.7 1.9 - 3.5 g/dL    A-G Ratio 1.4 g/dL   MAGNESIUM    Collection Time: 12/22/21  8:00 PM   Result Value Ref Range    Magnesium 1.9 1.5 - 2.5 mg/dL   ESTIMATED GFR    Collection Time: 12/22/21  8:00 PM   Result Value Ref Range    GFR If African American >60 >60 mL/min/1.73 m 2    GFR If Non African American >60 >60 mL/min/1.73 m 2   SARS-COV Antigen DEVI: Collect dry nasal swab    Collection Time: 12/22/21 11:25 PM   Result Value Ref Range    SARS-CoV-2 Source Nasal Swab     SARS-COV ANTIGEN DEVI Invalid NotDetected   CBC with Differential: Tomorrow AM    Collection Time: 12/23/21  5:40 AM   Result Value Ref Range    WBC 9.3 4.8 - 10.8 K/uL    RBC 3.90 (L) 4.20 - 5.40 M/uL    Hemoglobin 11.5 (L) 12.0 - 16.0 g/dL    Hematocrit 35.4 (L) 37.0 - 47.0 %    MCV 90.8 81.4 - 97.8 fL    MCH 29.5 27.0 - 33.0 pg    MCHC 32.5 (L) 33.6 - 35.0 g/dL    RDW 42.5 35.9 - 50.0 fL    Platelet Count 304 164 - 446 K/uL    MPV 9.0 9.0 - 12.9 fL    Neutrophils-Polys 64.10 44.00 - 72.00 %    Lymphocytes 28.20 22.00 - 41.00  %    Monocytes 6.80 0.00 - 13.40 %    Eosinophils 0.40 0.00 - 6.90 %    Basophils 0.20 0.00 - 1.80 %    Immature Granulocytes 0.30 0.00 - 0.90 %    Nucleated RBC 0.00 /100 WBC    Neutrophils (Absolute) 5.95 2.00 - 7.15 K/uL    Lymphs (Absolute) 2.62 1.00 - 4.80 K/uL    Monos (Absolute) 0.63 0.00 - 0.85 K/uL    Eos (Absolute) 0.04 0.00 - 0.51 K/uL    Baso (Absolute) 0.02 0.00 - 0.12 K/uL    Immature Granulocytes (abs) 0.03 0.00 - 0.11 K/uL    NRBC (Absolute) 0.00 K/uL   Comp Metabolic Panel (CMP): Tomorrow AM    Collection Time: 12/23/21  5:40 AM   Result Value Ref Range    Sodium 140 135 - 145 mmol/L    Potassium 3.6 3.6 - 5.5 mmol/L    Chloride 107 96 - 112 mmol/L    Co2 23 20 - 33 mmol/L    Anion Gap 10.0 7.0 - 16.0    Glucose 101 (H) 65 - 99 mg/dL    Bun 12 8 - 22 mg/dL    Creatinine 0.71 0.50 - 1.40 mg/dL    Calcium 8.4 (L) 8.5 - 10.5 mg/dL    AST(SGOT) 13 12 - 45 U/L    ALT(SGPT) 7 2 - 50 U/L    Alkaline Phosphatase 45 30 - 99 U/L    Total Bilirubin 1.0 0.1 - 1.5 mg/dL    Albumin 4.1 3.2 - 4.9 g/dL    Total Protein 6.5 6.0 - 8.2 g/dL    Globulin 2.4 1.9 - 3.5 g/dL    A-G Ratio 1.7 g/dL   Magnesium: Every Monday and Thursday AM    Collection Time: 12/23/21  5:40 AM   Result Value Ref Range    Magnesium 2.0 1.5 - 2.5 mg/dL   Phosphorus: Every Monday and Thursday AM    Collection Time: 12/23/21  5:40 AM   Result Value Ref Range    Phosphorus 3.0 2.5 - 4.5 mg/dL   ESTIMATED GFR    Collection Time: 12/23/21  5:40 AM   Result Value Ref Range    GFR If African American >60 >60 mL/min/1.73 m 2    GFR If Non African American >60 >60 mL/min/1.73 m 2   SARS-COV Antigen DEVI    Collection Time: 12/23/21  6:00 AM   Result Value Ref Range    SARS-CoV-2 Source Nasal Swab     SARS-COV ANTIGEN DEVI NotDetected NotDetected       Fluids    Intake/Output Summary (Last 24 hours) at 12/23/2021 0852  Last data filed at 12/23/2021 0800  Gross per 24 hour   Intake 2741.67 ml   Output 425 ml   Net 2316.67 ml       Core Measures & Quality  Metrics  Medications reviewed, Labs reviewed and Radiology images reviewed  Gao catheter: No Gao      DVT Prophylaxis: Contraindicated - High bleeding risk and Not indicated at this time, ambulatory  DVT prophylaxis - mechanical: SCDs  Ulcer prophylaxis: Not indicated        RAP Score Total: 5    ETOH Screening     Assessment complete date: 12/23/2021 (Negative admission BAL, CAGE not complete. Drinks alcohol socially, denies tobacco or illicit drug use. )        Assessment/Plan  * Closed fracture of occipital bone (HCC)- (present on admission)  Assessment & Plan  Nondisplaced left occipital skull fracture.  Isolated closed head injury.  BIG 2.Trauma Brain Injury Guidelines implemented.   TEG AA 12.1% ADP 23.5%  Neurosurgery consultation not indicated.    Contraindication to deep vein thrombosis (DVT) prophylaxis- (present on admission)  Assessment & Plan  Prophylactic anticoagulation for thrombotic prevention initially contraindicated secondary to elevated bleeding risk.    Dyslipidemia- (present on admission)  Assessment & Plan  Chronic condition treated with atorvastatin.  Resumed maintenance medication on admission.    Hypothyroid- (present on admission)  Assessment & Plan  Chronic condition treated with Levoxyl.  Resumed maintenance medication on admission.    Trauma- (present on admission)  Assessment & Plan  Slip and fall on ice.  Non Trauma Activation.  Sonia Panda MD. Trauma Surgery.      Discussed patient condition with RN, Patient and trauma surgery. Dr. Cabral

## 2022-01-27 ENCOUNTER — HOSPITAL ENCOUNTER (OUTPATIENT)
Dept: RADIOLOGY | Facility: MEDICAL CENTER | Age: 62
End: 2022-01-27
Attending: FAMILY MEDICINE
Payer: COMMERCIAL

## 2022-01-27 DIAGNOSIS — Z12.31 VISIT FOR SCREENING MAMMOGRAM: ICD-10-CM

## 2022-01-27 PROCEDURE — 77063 BREAST TOMOSYNTHESIS BI: CPT

## 2022-11-30 ENCOUNTER — HOSPITAL ENCOUNTER (OUTPATIENT)
Dept: RADIOLOGY | Facility: MEDICAL CENTER | Age: 62
End: 2022-11-30
Attending: ORTHOPAEDIC SURGERY
Payer: COMMERCIAL

## 2022-11-30 DIAGNOSIS — M25.511 RIGHT SHOULDER PAIN, UNSPECIFIED CHRONICITY: ICD-10-CM

## 2022-11-30 PROCEDURE — 73221 MRI JOINT UPR EXTREM W/O DYE: CPT | Mod: RT

## 2023-06-12 ENCOUNTER — HOSPITAL ENCOUNTER (OUTPATIENT)
Dept: RADIOLOGY | Facility: MEDICAL CENTER | Age: 63
End: 2023-06-12
Attending: OBSTETRICS & GYNECOLOGY
Payer: COMMERCIAL

## 2023-06-12 DIAGNOSIS — Z12.31 VISIT FOR SCREENING MAMMOGRAM: ICD-10-CM

## 2023-06-12 PROCEDURE — 77063 BREAST TOMOSYNTHESIS BI: CPT

## 2023-06-21 ENCOUNTER — HOSPITAL ENCOUNTER (OUTPATIENT)
Dept: RADIOLOGY | Facility: MEDICAL CENTER | Age: 63
End: 2023-06-21
Attending: OBSTETRICS & GYNECOLOGY
Payer: COMMERCIAL

## 2023-06-21 DIAGNOSIS — R92.8 ABNORMAL MAMMOGRAM: ICD-10-CM

## 2023-06-21 PROCEDURE — G0279 TOMOSYNTHESIS, MAMMO: HCPCS

## 2023-06-21 PROCEDURE — 76642 ULTRASOUND BREAST LIMITED: CPT | Mod: RT

## 2023-11-20 NOTE — ED NOTES
Pharmacy Medication Reconciliation      ~Medication reconciliation updated and complete per patient at bedside  ~Allergies have been verified and updated   ~No oral ABX within the last 30 days  ~Patient home pharmacy:[facility]             Awake

## 2024-08-08 ENCOUNTER — HOSPITAL ENCOUNTER (OUTPATIENT)
Dept: RADIOLOGY | Facility: MEDICAL CENTER | Age: 64
End: 2024-08-08
Attending: FAMILY MEDICINE
Payer: COMMERCIAL

## 2024-08-08 DIAGNOSIS — Z12.31 VISIT FOR SCREENING MAMMOGRAM: ICD-10-CM

## 2024-08-08 PROCEDURE — 77067 SCR MAMMO BI INCL CAD: CPT

## 2024-09-19 ENCOUNTER — APPOINTMENT (RX ONLY)
Dept: URBAN - METROPOLITAN AREA CLINIC 6 | Facility: CLINIC | Age: 64
Setting detail: DERMATOLOGY
End: 2024-09-19

## 2024-09-19 DIAGNOSIS — L81.3 CAFÉ AU LAIT SPOTS: ICD-10-CM

## 2024-09-19 DIAGNOSIS — Z71.89 OTHER SPECIFIED COUNSELING: ICD-10-CM

## 2024-09-19 DIAGNOSIS — L82.1 OTHER SEBORRHEIC KERATOSIS: ICD-10-CM

## 2024-09-19 DIAGNOSIS — D22 MELANOCYTIC NEVI: ICD-10-CM

## 2024-09-19 DIAGNOSIS — L81.4 OTHER MELANIN HYPERPIGMENTATION: ICD-10-CM

## 2024-09-19 DIAGNOSIS — D18.0 HEMANGIOMA: ICD-10-CM

## 2024-09-19 PROBLEM — D18.01 HEMANGIOMA OF SKIN AND SUBCUTANEOUS TISSUE: Status: ACTIVE | Noted: 2024-09-19

## 2024-09-19 PROBLEM — D22.5 MELANOCYTIC NEVI OF TRUNK: Status: ACTIVE | Noted: 2024-09-19

## 2024-09-19 PROBLEM — D22.72 MELANOCYTIC NEVI OF LEFT LOWER LIMB, INCLUDING HIP: Status: ACTIVE | Noted: 2024-09-19

## 2024-09-19 PROCEDURE — ? SUNSCREEN RECOMMENDATIONS

## 2024-09-19 PROCEDURE — ? PHOTO-DOCUMENTATION

## 2024-09-19 PROCEDURE — 99203 OFFICE O/P NEW LOW 30 MIN: CPT

## 2024-09-19 PROCEDURE — ? COUNSELING

## 2024-09-19 ASSESSMENT — LOCATION SIMPLE DESCRIPTION DERM
LOCATION SIMPLE: RIGHT UPPER BACK
LOCATION SIMPLE: CHEST
LOCATION SIMPLE: LEFT UPPER BACK
LOCATION SIMPLE: LEFT PLANTAR SURFACE
LOCATION SIMPLE: ABDOMEN

## 2024-09-19 ASSESSMENT — LOCATION DETAILED DESCRIPTION DERM
LOCATION DETAILED: LEFT SUPERIOR UPPER BACK
LOCATION DETAILED: RIGHT INFERIOR LATERAL UPPER BACK
LOCATION DETAILED: RIGHT MEDIAL SUPERIOR CHEST
LOCATION DETAILED: LEFT INSTEP
LOCATION DETAILED: PERIUMBILICAL SKIN
LOCATION DETAILED: RIGHT LATERAL SUPERIOR CHEST

## 2024-09-19 ASSESSMENT — LOCATION ZONE DERM
LOCATION ZONE: FEET
LOCATION ZONE: TRUNK

## 2024-09-19 NOTE — PROCEDURE: PHOTO-DOCUMENTATION
Details (Free Text): Captured clinical photos of nevus on left infoot, will plan on rechecking at follow up visit. Focal darker pigmentation and fibrillar/parallel furrow pattern.
Detail Level: Detailed
Photo Preface (Leave Blank If You Do Not Want): Photographs were obtained today

## 2024-09-19 NOTE — PROCEDURE: MIPS QUALITY
[address] [place] [address] Sports Medicine   Patient Discharge Instructions    Beata Mayorga / [id number] : 1950    Admitted 2022 Discharged: 2022     IF YOU HAVE ANY PROBLEMS ONCE YOU ARE AT HOME CALL THE FOLLOWING NUMBERS:   Main office number: [phone]    Your follow up appointment to see either Dr. Jesus Zepeda PA-C, or [facility] as scheduled in 2 weeks. If you are unsure of your appointment date call the office at [phone]. Medication Instructions     Resume your home medictions as directed, you may have directed not to resume supplements until after your follow up. A prescription for pain medication has been given   It is important that you take the medication exactly as they are prescribed. Keep your medication in the bottles provided by the pharmacist and keep a list of the medication names, dosages, and times to be taken in your wallet. Do not take other medications without consulting your doctor. What to do at [address] [place] [address] Ave your prehospital diet. If you have excessive nausea or vomitting call your doctor's office. Be sure to maintain adequate fluid intake. Some pain medications may cause constipation. Remember to drink fluids, stay as active as possible, and eat plenty of fiber-rich foods. Begin In-Home Physical Therapy; 3 times a week to work on gait training, range of motion, strengthening, and weight bearing exercises as tolerable. Continue to use your walker or cane when walking. May progress from the walker to a cane to complete total bearing as tolerable. Patient may shower. Wrap incision with plastic wrap/covering to prevent incision from getting wet. Avoid complete immersion. YOUR DRESSING SHOULD BE CHANGED BY YOUR HOME HEALTH NURSE 5-7 AFTER SURGERY ACCORDING TO THE DATE WRITTEN ON YOUR DRESSING.       When to Call    - Call if you have a temperature greater then 101  - Unable to keep food down  - Are
unable to bear any wieght   - Need a pain medication refill     Information obtained by :  I understand that if any problems occur once I am at home I am to contact my physician. I understand and acknowledge receipt of the instructions indicated above. Physician's or R.N.'s Signature                                                                  Date/Time                                                                                                                                              Patient or Representative Signature                                                          Date/Time         DISCHARGE SUMMARY from Nurse    PATIENT INSTRUCTIONS:    After general anesthesia or intravenous sedation, for 24 hours or while taking prescription Narcotics:  Limit your activities  Do not drive and operate hazardous machinery  Do not make important personal or business decisions  Do  not drink alcoholic beverages  If you have not urinated within 8 hours after discharge, please contact your surgeon on call. Report the following to your surgeon:  Excessive pain, swelling, redness or odor of or around the surgical area  Temperature over 100.5  Nausea and vomiting lasting longer than 4 hours or if unable to take medications  Any signs of decreased circulation or nerve impairment to extremity: change in color, persistent  numbness, tingling, coldness or increase pain  Any questions    What to do at Home:  Recommended activity: Activity as tolerated and no driving for today,     If you experience any of the following symptoms as noted above, please follow up with Dr. Javed Bro. *  Please give a list of your current medications to your Primary Care Provider.     *  Please update this list whenever your medications are discontinued, doses are      changed, or new medications
(including over-the-counter products) are added. *  Please carry medication information at all times in case of emergency situations. These are general instructions for a healthy lifestyle:    No smoking/ No tobacco products/ Avoid exposure to second hand smoke  Surgeon General's Warning:  Quitting smoking now greatly reduces serious risk to your health. Obesity, smoking, and sedentary lifestyle greatly increases your risk for illness    A healthy diet, regular physical exercise & weight monitoring are important for maintaining a healthy lifestyle    You may be retaining fluid if you have a history of heart failure or if you experience any of the following symptoms:  Weight gain of 3 pounds or more overnight or 5 pounds in a week, increased swelling in our hands or feet or shortness of breath while lying flat in bed. Please call your doctor as soon as you notice any of these symptoms; do not wait until your next office visit. The discharge information has been reviewed with the patient and caregiver. The patient and caregiver verbalized understanding. Discharge medications reviewed with the patient and caregiver and appropriate educational materials and side effects teaching were provided.   ___________________________________________________________________________________________________________________________________
Quality 130: Documentation Of Current Medications In The Medical Record: Current Medications Documented
Quality 226: Preventive Care And Screening: Tobacco Use: Screening And Cessation Intervention: Patient screened for tobacco use and is an ex/non-smoker
Detail Level: Detailed

## 2024-09-19 NOTE — PROCEDURE: SUNSCREEN RECOMMENDATIONS
Detail Level: Generalized
Products Recommended: Elta MD UV Clear and Brush on Block
General Sunscreen Counseling: I recommended a broad spectrum sunscreen with a SPF of 30 or higher.  I explained that SPF 30 sunscreens block approximately 97 percent of the sun's harmful rays.  Sunscreens should be applied at least 15 minutes prior to expected sun exposure and then every 2 hours after that as long as sun exposure continues. If swimming or exercising sunscreen should be reapplied every 45 minutes to an hour after getting wet or sweating.  One ounce, or the equivalent of a shot glass full of sunscreen, is adequate to protect the skin not covered by a bathing suit. I also recommended a lip balm with a sunscreen as well. Sun protective clothing can be used in lieu of sunscreen but must be worn the entire time you are exposed to the sun's rays.

## (undated) DEVICE — MASK ANESTHESIA ADULT  - (100/CA)

## (undated) DEVICE — WATER IRRIGATION STERILE 1000ML (12EA/CA)

## (undated) DEVICE — PACK TOTAL KNEE  (1/CA)

## (undated) DEVICE — SODIUM CHL. IRRIGATION 0.9% 3000ML (4EA/CA 65CA/PF)

## (undated) DEVICE — MIXER BONE CEMENT REVOLUTION - W/FEMORAL PRESSURIZER (6/CA)

## (undated) DEVICE — ELECTRODE DUAL RETURN W/ CORD - (50/PK)

## (undated) DEVICE — HEAD HOLDER JUNIOR/ADULT

## (undated) DEVICE — NEPTUNE 4 PORT MANIFOLD - (20/PK)

## (undated) DEVICE — GLOVE BIOGEL INDICATOR SZ 8 SURGICAL PF LTX - (50/BX 4BX/CA)

## (undated) DEVICE — SUTURE 0 VICRYL PLUS CT-1 - 8 X 18 INCH (12/BX)

## (undated) DEVICE — SENSOR SPO2 NEO LNCS ADHESIVE (20/BX) SEE USER NOTES

## (undated) DEVICE — GLOVE BIOGEL PI INDICATOR SZ 6.5 SURGICAL PF LF - (50/BX 4BX/CA)

## (undated) DEVICE — GLOVE SURGICAL PROTEXIS PI 8.0 LF - (50PR/BX)

## (undated) DEVICE — TRAY SRGPRP PVP IOD WT PRP - (20/CA)

## (undated) DEVICE — GLOVE 6.5 LF PF PROTEXIS (50PR/BX)

## (undated) DEVICE — STOCKINET TUBULAR 6IN STERILE - 6 X 48YDS (25/CA)

## (undated) DEVICE — SPONGE GAUZE STER 4X4 8-PL - (2/PK 50PK/BX 12BX/CS)

## (undated) DEVICE — ELECTRODE 850 FOAM ADHESIVE - HYDROGEL RADIOTRNSPRNT (50/PK)

## (undated) DEVICE — TIP INTPLS HFLO ML ORFC BTRY - (12/CS)  FOR SURGILAV

## (undated) DEVICE — SYRINGE 10 ML CONTROL LL (25EA/BX 4BX/CA)

## (undated) DEVICE — GLOVE SZ 7.5 LF PROTEXIS (50PR/BX)

## (undated) DEVICE — HANDPIECE 10FT INTPLS SCT PLS IRRIGATION HAND CONTROL SET (6/PK)

## (undated) DEVICE — KIT ROOM DECONTAMINATION

## (undated) DEVICE — SUTURE GENERAL

## (undated) DEVICE — MASK AIRWAY SZ 2.5 UNIQUE SILICON (10EA/BX)

## (undated) DEVICE — LACTATED RINGERS INJ 1000 ML - (14EA/CA 60CA/PF)

## (undated) DEVICE — GLOVE, LITE (PAIR)

## (undated) DEVICE — GOWN WARMING STANDARD FLEX - (30/CA)

## (undated) DEVICE — TUBE CONNECTING SUCTION - CLEAR PLASTIC STERILE 72 IN (50EA/CA)

## (undated) DEVICE — GLOVE BIOGEL PI INDICATOR SZ 7.5 SURGICAL PF LF -(50/BX 4BX/CA)

## (undated) DEVICE — GLOVE BIOGEL SZ 7.5 SURGICAL PF LTX - (50PR/BX 4BX/CA)

## (undated) DEVICE — Device

## (undated) DEVICE — GOWN SURGICAL X-LARGE ULTRA - FILM-REINFORCED (20/CA)

## (undated) DEVICE — NEEDLE NON SAFETY HYPO 22 GA X 1 1/2 IN (100/BX)

## (undated) DEVICE — IMMOBILIZER KNEE 20 INCH - (1/EA)

## (undated) DEVICE — BLADE SURGICAL #11 - (50/BX)

## (undated) DEVICE — BLADE SAGITTAL SAW DUAL CUT 25.0 X 90.0 X 1.27MM (1/EA)

## (undated) DEVICE — SUTURE 3-0 ETHILON FSLX 30 (36PK/BX)"

## (undated) DEVICE — BLOCK

## (undated) DEVICE — PROTECTOR ULNA NERVE - (36PR/CA)

## (undated) DEVICE — GLOVE BIOGEL PI INDICATOR SZ 8.0 SURGICAL PF LF -(50/BX 4BX/CA)

## (undated) DEVICE — SUCTION INSTRUMENT YANKAUER BULBOUS TIP W/O VENT (50EA/CA)

## (undated) DEVICE — HUMID-VENT HEAT AND MOISTURE EXCHANGE- (50/BX)

## (undated) DEVICE — CANISTER SUCTION RIGID RED 1500CC (40EA/CA)

## (undated) DEVICE — SODIUM CHL IRRIGATION 0.9% 1000ML (12EA/CA)

## (undated) DEVICE — GLOVE BIOGEL SZ 6.5 SURGICAL PF LTX (50PR/BX 4BX/CA)

## (undated) DEVICE — LENS/HOOD FOR SPACESUIT - (32/PK) PEEL AWAY FACE

## (undated) DEVICE — KIT ANESTHESIA W/CIRCUIT & 3/LT BAG W/FILTER (20EA/CA)

## (undated) DEVICE — BAG, SPONGE COUNT 50600

## (undated) DEVICE — STOCKINET BIAS 6 IN STERILE - (20/CA)

## (undated) DEVICE — GLOVE BIOGEL PI ORTHO SZ 8.5 PF LF (40/BX)

## (undated) DEVICE — CHLORAPREP 26 ML APPLICATOR - ORANGE TINT(25/CA)

## (undated) DEVICE — DRESSING ABDOMINAL PAD STERILE 8 X 10" (360EA/CA)"

## (undated) DEVICE — DRESSING XEROFORM 1X8 - (50/BX 4BX/CA)